# Patient Record
Sex: FEMALE | Race: WHITE | Employment: FULL TIME | URBAN - METROPOLITAN AREA
[De-identification: names, ages, dates, MRNs, and addresses within clinical notes are randomized per-mention and may not be internally consistent; named-entity substitution may affect disease eponyms.]

---

## 2020-09-09 ENCOUNTER — HOSPITAL ENCOUNTER (OUTPATIENT)
Dept: SLEEP MEDICINE | Age: 30
Discharge: HOME | End: 2020-09-09
Attending: PSYCHIATRY & NEUROLOGY
Payer: OTHER GOVERNMENT

## 2020-09-09 DIAGNOSIS — G47.33 OSA (OBSTRUCTIVE SLEEP APNEA): ICD-10-CM

## 2020-09-09 DIAGNOSIS — G47.9 SLEEP DISTURBANCES: ICD-10-CM

## 2020-09-09 PROCEDURE — 95810 POLYSOM 6/> YRS 4/> PARAM: CPT

## 2020-09-10 VITALS
TEMPERATURE: 97.8 F | DIASTOLIC BLOOD PRESSURE: 70 MMHG | HEART RATE: 58 BPM | HEIGHT: 65 IN | SYSTOLIC BLOOD PRESSURE: 105 MMHG

## 2021-03-30 ENCOUNTER — TELEPHONE (OUTPATIENT)
Dept: OBSTETRICS AND GYNECOLOGY | Facility: CLINIC | Age: 31
End: 2021-03-30

## 2021-03-30 DIAGNOSIS — N97.0 INFERTILITY ASSOCIATED WITH ANOVULATION: Primary | ICD-10-CM

## 2021-04-19 ENCOUNTER — TELEPHONE (OUTPATIENT)
Dept: OBSTETRICS AND GYNECOLOGY | Facility: CLINIC | Age: 31
End: 2021-04-19

## 2021-04-19 DIAGNOSIS — N97.0 INFERTILITY ASSOCIATED WITH ANOVULATION: Primary | ICD-10-CM

## 2021-04-19 RX ORDER — CLOMIPHENE CITRATE 50 MG/1
50 TABLET ORAL DAILY
COMMUNITY
End: 2021-10-25

## 2021-05-05 DIAGNOSIS — N97.0 INFERTILITY ASSOCIATED WITH ANOVULATION: Primary | ICD-10-CM

## 2021-05-10 ENCOUNTER — TELEPHONE (OUTPATIENT)
Dept: OBSTETRICS AND GYNECOLOGY | Facility: CLINIC | Age: 31
End: 2021-05-10

## 2021-05-27 DIAGNOSIS — N97.0 INFERTILITY ASSOCIATED WITH ANOVULATION: Primary | ICD-10-CM

## 2021-07-06 DIAGNOSIS — N97.0 INFERTILITY ASSOCIATED WITH ANOVULATION: Primary | ICD-10-CM

## 2021-07-09 ENCOUNTER — PATIENT MESSAGE (OUTPATIENT)
Dept: OBSTETRICS AND GYNECOLOGY | Facility: CLINIC | Age: 31
End: 2021-07-09

## 2021-07-12 ENCOUNTER — PATIENT MESSAGE (OUTPATIENT)
Dept: OBSTETRICS AND GYNECOLOGY | Facility: CLINIC | Age: 31
End: 2021-07-12

## 2021-08-30 ENCOUNTER — HOSPITAL ENCOUNTER (OUTPATIENT)
Dept: RADIOLOGY | Facility: HOSPITAL | Age: 31
Discharge: HOME | End: 2021-08-30
Attending: OBSTETRICS & GYNECOLOGY
Payer: OTHER GOVERNMENT

## 2021-08-30 ENCOUNTER — INITIAL PRENATAL (OUTPATIENT)
Dept: OBSTETRICS AND GYNECOLOGY | Facility: CLINIC | Age: 31
End: 2021-08-30
Payer: OTHER GOVERNMENT

## 2021-08-30 VITALS — WEIGHT: 169.31 LBS | SYSTOLIC BLOOD PRESSURE: 100 MMHG | DIASTOLIC BLOOD PRESSURE: 64 MMHG

## 2021-08-30 DIAGNOSIS — O09.291 HISTORY OF MISCARRIAGE, CURRENTLY PREGNANT, FIRST TRIMESTER: ICD-10-CM

## 2021-08-30 DIAGNOSIS — O09.90 HIGH RISK PREGNANCY, ANTEPARTUM: Primary | ICD-10-CM

## 2021-08-30 PROCEDURE — 87591 CHLAMYDIA/GONORRHOEAE(GC), PCR: ICD-10-PCS | Mod: ,,, | Performed by: CLINICAL MEDICAL LABORATORY

## 2021-08-30 PROCEDURE — 87491 CHLAMYDIA/GONORRHOEAE(GC), PCR: ICD-10-PCS | Mod: ,,, | Performed by: CLINICAL MEDICAL LABORATORY

## 2021-08-30 PROCEDURE — 87491 CHLMYD TRACH DNA AMP PROBE: CPT | Mod: ,,, | Performed by: CLINICAL MEDICAL LABORATORY

## 2021-08-30 PROCEDURE — 76801 OB US < 14 WKS SINGLE FETUS: CPT | Mod: TC

## 2021-08-30 PROCEDURE — 99203 PR OFFICE/OUTPT VISIT, NEW, LEVL III, 30-44 MIN: ICD-10-PCS | Mod: ,,, | Performed by: OBSTETRICS & GYNECOLOGY

## 2021-08-30 PROCEDURE — 76801 US OB <14 WEEKS TRANSABDOM, SINGLE GESTATION: ICD-10-PCS | Mod: 26,,, | Performed by: RADIOLOGY

## 2021-08-30 PROCEDURE — 87591 N.GONORRHOEAE DNA AMP PROB: CPT | Mod: ,,, | Performed by: CLINICAL MEDICAL LABORATORY

## 2021-08-30 PROCEDURE — 76801 OB US < 14 WKS SINGLE FETUS: CPT | Mod: 26,,, | Performed by: RADIOLOGY

## 2021-08-30 PROCEDURE — 99213 OFFICE O/P EST LOW 20 MIN: CPT | Mod: PBBFAC,25 | Performed by: OBSTETRICS & GYNECOLOGY

## 2021-08-30 PROCEDURE — 88142 CYTOPATH C/V THIN LAYER: CPT | Mod: GCY | Performed by: OBSTETRICS & GYNECOLOGY

## 2021-08-30 PROCEDURE — 99203 OFFICE O/P NEW LOW 30 MIN: CPT | Mod: ,,, | Performed by: OBSTETRICS & GYNECOLOGY

## 2021-08-31 LAB
CHLAMYDIA BY PCR: NEGATIVE
N. GONORRHOEAE (GC) BY PCR: NEGATIVE

## 2021-09-01 LAB
GH SERPL-MCNC: NORMAL NG/ML
INSULIN SERPL-ACNC: NORMAL U[IU]/ML
LAB AP CLINICAL INFORMATION: NORMAL
LAB AP GYN INTERPRETATION: NEGATIVE
LAB AP PAP DISCLAIMER COMMENTS: NORMAL
RENIN PLAS-CCNC: NORMAL NG/ML/H

## 2021-09-27 ENCOUNTER — ROUTINE PRENATAL (OUTPATIENT)
Dept: OBSTETRICS AND GYNECOLOGY | Facility: CLINIC | Age: 31
End: 2021-09-27
Payer: OTHER GOVERNMENT

## 2021-09-27 VITALS
DIASTOLIC BLOOD PRESSURE: 66 MMHG | BODY MASS INDEX: 28.03 KG/M2 | HEIGHT: 65 IN | SYSTOLIC BLOOD PRESSURE: 102 MMHG | WEIGHT: 168.25 LBS

## 2021-09-27 DIAGNOSIS — Z82.79 FAMILY HISTORY OF CLEFT LIP: ICD-10-CM

## 2021-09-27 DIAGNOSIS — Z82.79 FAMILY HISTORY OF SPINA BIFIDA: ICD-10-CM

## 2021-09-27 DIAGNOSIS — Z34.82 PRENATAL CARE, SUBSEQUENT PREGNANCY, SECOND TRIMESTER: Primary | ICD-10-CM

## 2021-09-27 LAB
BILIRUB SERPL-MCNC: NEGATIVE MG/DL
BLOOD URINE, POC: NEGATIVE
COLOR, POC UA: NORMAL
GLUCOSE UR QL STRIP: NEGATIVE
KETONES UR QL STRIP: NEGATIVE
LEUKOCYTE ESTERASE URINE, POC: NEGATIVE
NITRITE, POC UA: NEGATIVE
PH, POC UA: NORMAL
PROTEIN, POC: NEGATIVE
SPECIFIC GRAVITY, POC UA: NORMAL
UROBILINOGEN, POC UA: NEGATIVE

## 2021-09-27 PROCEDURE — 81003 URINALYSIS AUTO W/O SCOPE: CPT | Mod: PBBFAC | Performed by: OBSTETRICS & GYNECOLOGY

## 2021-09-27 PROCEDURE — 0502F PR SUBSEQUENT PRENATAL CARE: ICD-10-PCS | Mod: ,,, | Performed by: OBSTETRICS & GYNECOLOGY

## 2021-09-27 PROCEDURE — 99213 OFFICE O/P EST LOW 20 MIN: CPT | Mod: PBBFAC | Performed by: OBSTETRICS & GYNECOLOGY

## 2021-09-27 PROCEDURE — 0502F SUBSEQUENT PRENATAL CARE: CPT | Mod: ,,, | Performed by: OBSTETRICS & GYNECOLOGY

## 2021-10-25 ENCOUNTER — HOSPITAL ENCOUNTER (OUTPATIENT)
Dept: RADIOLOGY | Facility: HOSPITAL | Age: 31
Discharge: HOME | End: 2021-10-25
Attending: OBSTETRICS & GYNECOLOGY
Payer: OTHER GOVERNMENT

## 2021-10-25 ENCOUNTER — ROUTINE PRENATAL (OUTPATIENT)
Dept: OBSTETRICS AND GYNECOLOGY | Facility: CLINIC | Age: 31
End: 2021-10-25
Payer: OTHER GOVERNMENT

## 2021-10-25 VITALS
BODY MASS INDEX: 28.56 KG/M2 | WEIGHT: 171.63 LBS | DIASTOLIC BLOOD PRESSURE: 82 MMHG | SYSTOLIC BLOOD PRESSURE: 120 MMHG

## 2021-10-25 DIAGNOSIS — O09.90 HIGH RISK PREGNANCY, ANTEPARTUM: Primary | ICD-10-CM

## 2021-10-25 DIAGNOSIS — O26.12 LOW WEIGHT GAIN DURING PREGNANCY IN SECOND TRIMESTER: ICD-10-CM

## 2021-10-25 PROCEDURE — 0502F SUBSEQUENT PRENATAL CARE: CPT | Mod: ,,, | Performed by: OBSTETRICS & GYNECOLOGY

## 2021-10-25 PROCEDURE — 81003 URINALYSIS AUTO W/O SCOPE: CPT | Mod: PBBFAC | Performed by: OBSTETRICS & GYNECOLOGY

## 2021-10-25 PROCEDURE — 76805 OB US >/= 14 WKS SNGL FETUS: CPT | Mod: TC

## 2021-10-25 PROCEDURE — 99213 OFFICE O/P EST LOW 20 MIN: CPT | Mod: PBBFAC,25 | Performed by: OBSTETRICS & GYNECOLOGY

## 2021-10-25 PROCEDURE — 76805 OB US >/= 14 WKS SNGL FETUS: CPT | Mod: 26,,, | Performed by: RADIOLOGY

## 2021-10-25 PROCEDURE — 0502F PR SUBSEQUENT PRENATAL CARE: ICD-10-PCS | Mod: ,,, | Performed by: OBSTETRICS & GYNECOLOGY

## 2021-10-25 PROCEDURE — 76805 US OB 14+ WEEKS, TRANSABDOM, SINGLE GESTATION: ICD-10-PCS | Mod: 26,,, | Performed by: RADIOLOGY

## 2021-11-22 ENCOUNTER — ROUTINE PRENATAL (OUTPATIENT)
Dept: OBSTETRICS AND GYNECOLOGY | Facility: CLINIC | Age: 31
End: 2021-11-22
Payer: OTHER GOVERNMENT

## 2021-11-22 VITALS — BODY MASS INDEX: 29.54 KG/M2 | SYSTOLIC BLOOD PRESSURE: 90 MMHG | DIASTOLIC BLOOD PRESSURE: 64 MMHG | WEIGHT: 177.5 LBS

## 2021-11-22 DIAGNOSIS — O09.90 HIGH RISK PREGNANCY, ANTEPARTUM: Primary | ICD-10-CM

## 2021-11-22 PROCEDURE — 0502F PR SUBSEQUENT PRENATAL CARE: ICD-10-PCS | Mod: ,,, | Performed by: OBSTETRICS & GYNECOLOGY

## 2021-11-22 PROCEDURE — 99213 OFFICE O/P EST LOW 20 MIN: CPT | Mod: PBBFAC | Performed by: OBSTETRICS & GYNECOLOGY

## 2021-11-22 PROCEDURE — 81003 URINALYSIS AUTO W/O SCOPE: CPT | Mod: PBBFAC | Performed by: OBSTETRICS & GYNECOLOGY

## 2021-11-22 PROCEDURE — 0502F SUBSEQUENT PRENATAL CARE: CPT | Mod: ,,, | Performed by: OBSTETRICS & GYNECOLOGY

## 2022-01-05 ENCOUNTER — ROUTINE PRENATAL (OUTPATIENT)
Dept: OBSTETRICS AND GYNECOLOGY | Facility: CLINIC | Age: 32
End: 2022-01-05
Payer: OTHER GOVERNMENT

## 2022-01-05 VITALS — BODY MASS INDEX: 30.45 KG/M2 | DIASTOLIC BLOOD PRESSURE: 50 MMHG | SYSTOLIC BLOOD PRESSURE: 98 MMHG | WEIGHT: 183 LBS

## 2022-01-05 DIAGNOSIS — Z3A.28 28 WEEKS GESTATION OF PREGNANCY: ICD-10-CM

## 2022-01-05 DIAGNOSIS — O09.90 HIGH RISK PREGNANCY, ANTEPARTUM: Primary | ICD-10-CM

## 2022-01-05 PROCEDURE — 0502F PR SUBSEQUENT PRENATAL CARE: ICD-10-PCS | Mod: ,,, | Performed by: OBSTETRICS & GYNECOLOGY

## 2022-01-05 PROCEDURE — 81003 URINALYSIS AUTO W/O SCOPE: CPT | Mod: PBBFAC | Performed by: OBSTETRICS & GYNECOLOGY

## 2022-01-05 PROCEDURE — 99213 OFFICE O/P EST LOW 20 MIN: CPT | Mod: PBBFAC | Performed by: OBSTETRICS & GYNECOLOGY

## 2022-01-05 PROCEDURE — 0502F SUBSEQUENT PRENATAL CARE: CPT | Mod: ,,, | Performed by: OBSTETRICS & GYNECOLOGY

## 2022-01-05 RX ORDER — OMEPRAZOLE 10 MG/1
10 CAPSULE, DELAYED RELEASE ORAL DAILY
COMMUNITY

## 2022-01-06 RX ORDER — LANOLIN ALCOHOL/MO/W.PET/CERES
1 CREAM (GRAM) TOPICAL DAILY
Qty: 30 TABLET | Refills: 6 | Status: CANCELLED | OUTPATIENT
Start: 2022-01-06

## 2022-01-07 NOTE — PROGRESS NOTES
31 y.o. female  at 28w6d   Reports fetal movement or fluttering. Denies any vaginal bleeding, leakage of fluid, cramping, contractions, or pressure.   Total weight gain/weight loss in pregnancy: 8.165 kg (18 lb)     A: 28w6d     ICD-10-CM ICD-9-CM    1. High risk pregnancy, antepartum  O09.90 V23.9 POCT URINALYSIS W/O SCOPE   2. 28 weeks gestation of pregnancy  Z3A.28 V22.2 POCT URINALYSIS W/O SCOPE       P: Bleeding, daily fetal kick counts, and  labor/labor precautions discussed.  Questions answered to desired level of satisfaction  Verbalized understanding to all information and instructions provided.    Roshan Payne MD

## 2022-01-07 NOTE — PROGRESS NOTES
31 y.o. female  at 28w6d   Reports fetal movement or fluttering. Denies any vaginal bleeding, leakage of fluid, cramping, contractions, or pressure.   Total weight gain/weight loss in pregnancy: 8.165 kg (18 lb)     A: 28w6d     ICD-10-CM ICD-9-CM    1. High risk pregnancy, antepartum  O09.90 V23.9 POCT URINALYSIS W/O SCOPE   2. 28 weeks gestation of pregnancy  Z3A.28 V22.2 POCT URINALYSIS W/O SCOPE       P: Bleeding, daily fetal kick counts, and  labor/labor precautions discussed.  Questions answered to desired level of satisfaction  Verbalized understanding to all information and instructions provided.    Follow up in about 2 weeks (around 2022).    Roshan Payne MD

## 2022-02-01 ENCOUNTER — ROUTINE PRENATAL (OUTPATIENT)
Dept: OBSTETRICS AND GYNECOLOGY | Facility: CLINIC | Age: 32
End: 2022-02-01
Payer: OTHER GOVERNMENT

## 2022-02-01 VITALS — SYSTOLIC BLOOD PRESSURE: 98 MMHG | DIASTOLIC BLOOD PRESSURE: 60 MMHG | WEIGHT: 182.38 LBS | BODY MASS INDEX: 30.35 KG/M2

## 2022-02-01 DIAGNOSIS — Z3A.32 32 WEEKS GESTATION OF PREGNANCY: ICD-10-CM

## 2022-02-01 DIAGNOSIS — O09.90 HIGH RISK PREGNANCY, ANTEPARTUM: Primary | ICD-10-CM

## 2022-02-01 PROCEDURE — 81003 URINALYSIS AUTO W/O SCOPE: CPT | Mod: PBBFAC | Performed by: OBSTETRICS & GYNECOLOGY

## 2022-02-01 PROCEDURE — 0502F PR SUBSEQUENT PRENATAL CARE: ICD-10-PCS | Mod: ,,, | Performed by: OBSTETRICS & GYNECOLOGY

## 2022-02-01 PROCEDURE — 99213 OFFICE O/P EST LOW 20 MIN: CPT | Mod: PBBFAC | Performed by: OBSTETRICS & GYNECOLOGY

## 2022-02-01 PROCEDURE — 0502F SUBSEQUENT PRENATAL CARE: CPT | Mod: ,,, | Performed by: OBSTETRICS & GYNECOLOGY

## 2022-02-15 ENCOUNTER — ROUTINE PRENATAL (OUTPATIENT)
Dept: OBSTETRICS AND GYNECOLOGY | Facility: CLINIC | Age: 32
End: 2022-02-15
Payer: OTHER GOVERNMENT

## 2022-02-15 VITALS — BODY MASS INDEX: 30.95 KG/M2 | WEIGHT: 186 LBS | DIASTOLIC BLOOD PRESSURE: 58 MMHG | SYSTOLIC BLOOD PRESSURE: 102 MMHG

## 2022-02-15 DIAGNOSIS — O09.90 HIGH RISK PREGNANCY, ANTEPARTUM: Primary | ICD-10-CM

## 2022-02-15 PROCEDURE — 99213 OFFICE O/P EST LOW 20 MIN: CPT | Mod: PBBFAC | Performed by: OBSTETRICS & GYNECOLOGY

## 2022-02-15 PROCEDURE — 0502F PR SUBSEQUENT PRENATAL CARE: ICD-10-PCS | Mod: S$PBB,,, | Performed by: OBSTETRICS & GYNECOLOGY

## 2022-02-15 PROCEDURE — 0502F SUBSEQUENT PRENATAL CARE: CPT | Mod: S$PBB,,, | Performed by: OBSTETRICS & GYNECOLOGY

## 2022-02-15 RX ORDER — FERROUS SULFATE 325(65) MG
325 TABLET ORAL
COMMUNITY

## 2022-02-15 NOTE — PROGRESS NOTES
31 y.o. female  at 34w3d   Reports fetal movement or fluttering. Denies any vaginal bleeding, leakage of fluid, cramping, contractions, or pressure.   Total weight gain/weight loss in pregnancy: 9.526 kg (21 lb)     A: 34w3d     ICD-10-CM ICD-9-CM    1. High risk pregnancy, antepartum  O09.90 V23.9 POCT URINALYSIS W/O SCOPE       P: Bleeding, daily fetal kick counts, and  labor/labor precautions discussed.  Questions answered to desired level of satisfaction  Verbalized understanding to all information and instructions provided.    Follow up in about 2 weeks (around 3/1/2022).    Roshan Payne MD

## 2022-02-27 NOTE — PROGRESS NOTES
31 y.o. female  at 32w3d   Reports fetal movement or fluttering. Denies any vaginal bleeding, leakage of fluid, cramping, contractions, or pressure.   Total weight gain/weight loss in pregnancy: 7.893 kg (17 lb 6.4 oz)     A: 32w3d     ICD-10-CM ICD-9-CM    1. High risk pregnancy, antepartum  O09.90 V23.9 POCT URINALYSIS W/O SCOPE   2. 32 weeks gestation of pregnancy  Z3A.32 V22.2 POCT URINALYSIS W/O SCOPE       P: Bleeding, daily fetal kick counts, and  labor/labor precautions discussed.  Questions answered to desired level of satisfaction  Verbalized understanding to all information and instructions provided.    No follow-ups on file.    Roshan Payne MD

## 2022-03-03 ENCOUNTER — ROUTINE PRENATAL (OUTPATIENT)
Dept: OBSTETRICS AND GYNECOLOGY | Facility: CLINIC | Age: 32
End: 2022-03-03
Payer: OTHER GOVERNMENT

## 2022-03-03 ENCOUNTER — HOSPITAL ENCOUNTER (OUTPATIENT)
Dept: RADIOLOGY | Facility: HOSPITAL | Age: 32
Discharge: HOME | End: 2022-03-03
Attending: OBSTETRICS & GYNECOLOGY
Payer: OTHER GOVERNMENT

## 2022-03-03 VITALS
SYSTOLIC BLOOD PRESSURE: 110 MMHG | DIASTOLIC BLOOD PRESSURE: 62 MMHG | WEIGHT: 190.38 LBS | BODY MASS INDEX: 31.68 KG/M2

## 2022-03-03 DIAGNOSIS — O09.90 HIGH RISK PREGNANCY, ANTEPARTUM: ICD-10-CM

## 2022-03-03 DIAGNOSIS — O09.90 HIGH RISK PREGNANCY, ANTEPARTUM: Primary | ICD-10-CM

## 2022-03-03 PROCEDURE — 0502F SUBSEQUENT PRENATAL CARE: CPT | Mod: ,,, | Performed by: OBSTETRICS & GYNECOLOGY

## 2022-03-03 PROCEDURE — 87653 STREP B DNA AMP PROBE: CPT | Mod: ,,, | Performed by: CLINICAL MEDICAL LABORATORY

## 2022-03-03 PROCEDURE — 87653 STREP B SCREEN, ANTEPARTUM: ICD-10-PCS | Mod: ,,, | Performed by: CLINICAL MEDICAL LABORATORY

## 2022-03-03 PROCEDURE — 81003 URINALYSIS AUTO W/O SCOPE: CPT | Mod: PBBFAC | Performed by: OBSTETRICS & GYNECOLOGY

## 2022-03-03 PROCEDURE — 76816 OB US FOLLOW-UP PER FETUS: CPT | Mod: 26,,, | Performed by: RADIOLOGY

## 2022-03-03 PROCEDURE — 99213 OFFICE O/P EST LOW 20 MIN: CPT | Mod: PBBFAC | Performed by: OBSTETRICS & GYNECOLOGY

## 2022-03-03 PROCEDURE — 76816 US OB FOLLOW UP EA GESTATION TRANSABDOMINAL: ICD-10-PCS | Mod: 26,,, | Performed by: RADIOLOGY

## 2022-03-03 PROCEDURE — 76816 OB US FOLLOW-UP PER FETUS: CPT | Mod: TC

## 2022-03-03 PROCEDURE — 0502F PR SUBSEQUENT PRENATAL CARE: ICD-10-PCS | Mod: ,,, | Performed by: OBSTETRICS & GYNECOLOGY

## 2022-03-04 LAB — GROUP B STREP, PCR: NEGATIVE

## 2022-03-06 NOTE — PROGRESS NOTES
31 y.o. female  at 36w5d   Reports fetal movement or fluttering. Denies any vaginal bleeding, leakage of fluid, cramping, contractions, or pressure.   Total weight gain/weight loss in pregnancy: 11.5 kg (25 lb 6.4 oz)     A: 36w5d     ICD-10-CM ICD-9-CM    1. High risk pregnancy, antepartum  O09.90 V23.9 POCT URINALYSIS W/O SCOPE      Strep B Screen, Antepartum       P: Bleeding, daily fetal kick counts, and  labor/labor precautions discussed.  Questions answered to desired level of satisfaction  Verbalized understanding to all information and instructions provided.    Follow up in about 1 year (around 3/3/2023).    Roshan Payne MD

## 2022-03-10 ENCOUNTER — ROUTINE PRENATAL (OUTPATIENT)
Dept: OBSTETRICS AND GYNECOLOGY | Facility: CLINIC | Age: 32
End: 2022-03-10
Payer: OTHER GOVERNMENT

## 2022-03-10 VITALS — BODY MASS INDEX: 31.8 KG/M2 | SYSTOLIC BLOOD PRESSURE: 102 MMHG | DIASTOLIC BLOOD PRESSURE: 62 MMHG | WEIGHT: 191.13 LBS

## 2022-03-10 DIAGNOSIS — O09.90 HIGH RISK PREGNANCY, ANTEPARTUM: Primary | ICD-10-CM

## 2022-03-10 PROCEDURE — 0502F PR SUBSEQUENT PRENATAL CARE: ICD-10-PCS | Mod: ,,, | Performed by: OBSTETRICS & GYNECOLOGY

## 2022-03-10 PROCEDURE — 0502F SUBSEQUENT PRENATAL CARE: CPT | Mod: ,,, | Performed by: OBSTETRICS & GYNECOLOGY

## 2022-03-10 PROCEDURE — 81003 URINALYSIS AUTO W/O SCOPE: CPT | Mod: PBBFAC | Performed by: OBSTETRICS & GYNECOLOGY

## 2022-03-10 PROCEDURE — 99213 OFFICE O/P EST LOW 20 MIN: CPT | Mod: PBBFAC | Performed by: OBSTETRICS & GYNECOLOGY

## 2022-03-15 ENCOUNTER — HOSPITAL ENCOUNTER (INPATIENT)
Facility: HOSPITAL | Age: 32
LOS: 2 days | Discharge: HOME | End: 2022-03-18
Attending: OBSTETRICS & GYNECOLOGY | Admitting: OBSTETRICS & GYNECOLOGY
Payer: OTHER GOVERNMENT

## 2022-03-15 DIAGNOSIS — Z3A.38 38 WEEKS GESTATION OF PREGNANCY: ICD-10-CM

## 2022-03-15 DIAGNOSIS — Z34.90 PREGNANCY: ICD-10-CM

## 2022-03-15 RX ORDER — ONDANSETRON 4 MG/1
8 TABLET, ORALLY DISINTEGRATING ORAL EVERY 8 HOURS PRN
Status: DISCONTINUED | OUTPATIENT
Start: 2022-03-15 | End: 2022-03-16

## 2022-03-15 RX ORDER — PROCHLORPERAZINE EDISYLATE 5 MG/ML
5 INJECTION INTRAMUSCULAR; INTRAVENOUS EVERY 6 HOURS PRN
Status: DISCONTINUED | OUTPATIENT
Start: 2022-03-16 | End: 2022-03-17

## 2022-03-15 RX ORDER — OXYTOCIN/RINGER'S LACTATE 30/500 ML
0-30 PLASTIC BAG, INJECTION (ML) INTRAVENOUS CONTINUOUS
Status: DISCONTINUED | OUTPATIENT
Start: 2022-03-16 | End: 2022-03-17

## 2022-03-15 RX ORDER — CALCIUM CARBONATE 200(500)MG
500 TABLET,CHEWABLE ORAL 3 TIMES DAILY PRN
Status: DISCONTINUED | OUTPATIENT
Start: 2022-03-16 | End: 2022-03-18 | Stop reason: HOSPADM

## 2022-03-15 RX ORDER — SODIUM CHLORIDE, SODIUM LACTATE, POTASSIUM CHLORIDE, CALCIUM CHLORIDE 600; 310; 30; 20 MG/100ML; MG/100ML; MG/100ML; MG/100ML
INJECTION, SOLUTION INTRAVENOUS CONTINUOUS
Status: DISCONTINUED | OUTPATIENT
Start: 2022-03-16 | End: 2022-03-16

## 2022-03-15 RX ORDER — SODIUM CHLORIDE 9 MG/ML
INJECTION, SOLUTION INTRAVENOUS
Status: DISCONTINUED | OUTPATIENT
Start: 2022-03-16 | End: 2022-03-17

## 2022-03-15 RX ORDER — ACETAMINOPHEN 325 MG/1
650 TABLET ORAL EVERY 6 HOURS PRN
Status: DISCONTINUED | OUTPATIENT
Start: 2022-03-16 | End: 2022-03-17

## 2022-03-15 RX ORDER — OXYTOCIN/RINGER'S LACTATE 30/500 ML
95 PLASTIC BAG, INJECTION (ML) INTRAVENOUS ONCE
Status: DISCONTINUED | OUTPATIENT
Start: 2022-03-15 | End: 2022-03-17

## 2022-03-15 RX ORDER — OXYTOCIN/RINGER'S LACTATE 30/500 ML
334 PLASTIC BAG, INJECTION (ML) INTRAVENOUS ONCE
Status: DISCONTINUED | OUTPATIENT
Start: 2022-03-15 | End: 2022-03-17

## 2022-03-15 RX ORDER — SIMETHICONE 80 MG
1 TABLET,CHEWABLE ORAL 4 TIMES DAILY PRN
Status: DISCONTINUED | OUTPATIENT
Start: 2022-03-16 | End: 2022-03-18 | Stop reason: HOSPADM

## 2022-03-15 RX ORDER — BUTORPHANOL TARTRATE 1 MG/ML
1 INJECTION INTRAMUSCULAR; INTRAVENOUS
Status: DISCONTINUED | OUTPATIENT
Start: 2022-03-16 | End: 2022-03-17

## 2022-03-15 RX ORDER — SODIUM CHLORIDE, SODIUM LACTATE, POTASSIUM CHLORIDE, CALCIUM CHLORIDE 600; 310; 30; 20 MG/100ML; MG/100ML; MG/100ML; MG/100ML
INJECTION, SOLUTION INTRAVENOUS CONTINUOUS
Status: DISCONTINUED | OUTPATIENT
Start: 2022-03-16 | End: 2022-03-18 | Stop reason: HOSPADM

## 2022-03-15 RX ORDER — ONDANSETRON 4 MG/1
8 TABLET, ORALLY DISINTEGRATING ORAL EVERY 8 HOURS PRN
Status: DISCONTINUED | OUTPATIENT
Start: 2022-03-16 | End: 2022-03-17

## 2022-03-15 RX ORDER — ACETAMINOPHEN 500 MG
500 TABLET ORAL EVERY 6 HOURS PRN
Status: DISCONTINUED | OUTPATIENT
Start: 2022-03-15 | End: 2022-03-16

## 2022-03-15 RX ORDER — PROCHLORPERAZINE EDISYLATE 5 MG/ML
5 INJECTION INTRAMUSCULAR; INTRAVENOUS EVERY 6 HOURS PRN
Status: DISCONTINUED | OUTPATIENT
Start: 2022-03-15 | End: 2022-03-16

## 2022-03-16 ENCOUNTER — ANESTHESIA (OUTPATIENT)
Dept: OBSTETRICS AND GYNECOLOGY | Facility: HOSPITAL | Age: 32
End: 2022-03-16
Payer: OTHER GOVERNMENT

## 2022-03-16 LAB
ALBUMIN SERPL BCP-MCNC: 2.7 G/DL (ref 3.5–5)
ALBUMIN/GLOB SERPL: 0.7 {RATIO}
ALP SERPL-CCNC: 142 U/L (ref 37–98)
ALT SERPL W P-5'-P-CCNC: 25 U/L (ref 13–56)
ANION GAP SERPL CALCULATED.3IONS-SCNC: 11 MMOL/L (ref 7–16)
AST SERPL W P-5'-P-CCNC: 23 U/L (ref 15–37)
BASOPHILS # BLD AUTO: 0.04 K/UL (ref 0–0.2)
BASOPHILS NFR BLD AUTO: 0.4 % (ref 0–1)
BILIRUB SERPL-MCNC: 0.2 MG/DL (ref 0–1.2)
BUN SERPL-MCNC: 11 MG/DL (ref 7–18)
BUN/CREAT SERPL: 14 (ref 6–20)
CALCIUM SERPL-MCNC: 8.9 MG/DL (ref 8.5–10.1)
CHLORIDE SERPL-SCNC: 105 MMOL/L (ref 98–107)
CO2 SERPL-SCNC: 22 MMOL/L (ref 21–32)
CREAT SERPL-MCNC: 0.76 MG/DL (ref 0.55–1.02)
DIFFERENTIAL METHOD BLD: ABNORMAL
EOSINOPHIL # BLD AUTO: 0.06 K/UL (ref 0–0.5)
EOSINOPHIL NFR BLD AUTO: 0.6 % (ref 1–4)
ERYTHROCYTE [DISTWIDTH] IN BLOOD BY AUTOMATED COUNT: 12.6 % (ref 11.5–14.5)
GLOBULIN SER-MCNC: 3.7 G/DL (ref 2–4)
GLUCOSE SERPL-MCNC: 72 MG/DL (ref 74–106)
HBV SURFACE AG SERPL QL IA: NORMAL
HCT VFR BLD AUTO: 37.3 % (ref 38–47)
HGB BLD-MCNC: 12.8 G/DL (ref 12–16)
HIV 1+O+2 AB SERPL QL: NORMAL
IMM GRANULOCYTES # BLD AUTO: 0.03 K/UL (ref 0–0.04)
IMM GRANULOCYTES NFR BLD: 0.3 % (ref 0–0.4)
INDIRECT COOMBS: NORMAL
LYMPHOCYTES # BLD AUTO: 1.53 K/UL (ref 1–4.8)
LYMPHOCYTES NFR BLD AUTO: 15.4 % (ref 27–41)
MCH RBC QN AUTO: 29.7 PG (ref 27–31)
MCHC RBC AUTO-ENTMCNC: 34.3 G/DL (ref 32–36)
MCV RBC AUTO: 86.5 FL (ref 80–96)
MONOCYTES # BLD AUTO: 0.69 K/UL (ref 0–0.8)
MONOCYTES NFR BLD AUTO: 7 % (ref 2–6)
MPC BLD CALC-MCNC: 9.6 FL (ref 9.4–12.4)
NEUTROPHILS # BLD AUTO: 7.57 K/UL (ref 1.8–7.7)
NEUTROPHILS NFR BLD AUTO: 76.3 % (ref 53–65)
NRBC # BLD AUTO: 0 X10E3/UL
NRBC, AUTO (.00): 0 %
PLATELET # BLD AUTO: 261 K/UL (ref 150–400)
POTASSIUM SERPL-SCNC: 4.4 MMOL/L (ref 3.5–5.1)
PROT SERPL-MCNC: 6.4 G/DL (ref 6.4–8.2)
RBC # BLD AUTO: 4.31 M/UL (ref 4.2–5.4)
RH BLD: NORMAL
SODIUM SERPL-SCNC: 134 MMOL/L (ref 136–145)
SYPHILIS AB INTERPRETATION: NORMAL
WBC # BLD AUTO: 9.92 K/UL (ref 4.5–11)

## 2022-03-16 PROCEDURE — 11000001 HC ACUTE MED/SURG PRIVATE ROOM

## 2022-03-16 PROCEDURE — 59400 OBSTETRICAL CARE: CPT | Mod: ,,, | Performed by: OBSTETRICS & GYNECOLOGY

## 2022-03-16 PROCEDURE — 63600175 PHARM REV CODE 636 W HCPCS: Performed by: OBSTETRICS & GYNECOLOGY

## 2022-03-16 PROCEDURE — 87340 HEPATITIS B SURFACE AG IA: CPT | Performed by: OBSTETRICS & GYNECOLOGY

## 2022-03-16 PROCEDURE — 86901 BLOOD TYPING SEROLOGIC RH(D): CPT | Performed by: OBSTETRICS & GYNECOLOGY

## 2022-03-16 PROCEDURE — 86900 BLOOD TYPING SEROLOGIC ABO: CPT | Performed by: OBSTETRICS & GYNECOLOGY

## 2022-03-16 PROCEDURE — 63600175 PHARM REV CODE 636 W HCPCS: Performed by: ANESTHESIOLOGY

## 2022-03-16 PROCEDURE — 59410 PRA OBSTE CARE,VAG DELIV+POSTPARTUM: ICD-10-PCS | Mod: AA,,, | Performed by: ANESTHESIOLOGY

## 2022-03-16 PROCEDURE — 59410 OBSTETRICAL CARE: CPT | Mod: AA,,, | Performed by: ANESTHESIOLOGY

## 2022-03-16 PROCEDURE — 85025 COMPLETE CBC W/AUTO DIFF WBC: CPT | Performed by: OBSTETRICS & GYNECOLOGY

## 2022-03-16 PROCEDURE — 86780 TREPONEMA PALLIDUM: CPT | Performed by: OBSTETRICS & GYNECOLOGY

## 2022-03-16 PROCEDURE — 59400 PR FULL ROUT OBSTE CARE,VAGINAL DELIV: ICD-10-PCS | Mod: ,,, | Performed by: OBSTETRICS & GYNECOLOGY

## 2022-03-16 PROCEDURE — 62326 NJX INTERLAMINAR LMBR/SAC: CPT | Mod: AA | Performed by: ANESTHESIOLOGY

## 2022-03-16 PROCEDURE — 36415 COLL VENOUS BLD VENIPUNCTURE: CPT | Performed by: OBSTETRICS & GYNECOLOGY

## 2022-03-16 PROCEDURE — 87389 HIV-1 AG W/HIV-1&-2 AB AG IA: CPT | Performed by: OBSTETRICS & GYNECOLOGY

## 2022-03-16 PROCEDURE — 80053 COMPREHEN METABOLIC PANEL: CPT | Performed by: OBSTETRICS & GYNECOLOGY

## 2022-03-16 PROCEDURE — C1751 CATH, INF, PER/CENT/MIDLINE: HCPCS | Performed by: ANESTHESIOLOGY

## 2022-03-16 PROCEDURE — 25000003 PHARM REV CODE 250: Performed by: ANESTHESIOLOGY

## 2022-03-16 RX ORDER — KETOROLAC TROMETHAMINE 30 MG/ML
30 INJECTION, SOLUTION INTRAMUSCULAR; INTRAVENOUS EVERY 6 HOURS
Status: CANCELLED | OUTPATIENT
Start: 2022-03-16 | End: 2022-03-17

## 2022-03-16 RX ORDER — PROCHLORPERAZINE EDISYLATE 5 MG/ML
5 INJECTION INTRAMUSCULAR; INTRAVENOUS EVERY 6 HOURS PRN
Status: DISCONTINUED | OUTPATIENT
Start: 2022-03-16 | End: 2022-03-18 | Stop reason: HOSPADM

## 2022-03-16 RX ORDER — OXYTOCIN/RINGER'S LACTATE 30/500 ML
95 PLASTIC BAG, INJECTION (ML) INTRAVENOUS ONCE
Status: CANCELLED | OUTPATIENT
Start: 2022-03-16 | End: 2022-03-16

## 2022-03-16 RX ORDER — EPHEDRINE SULFATE 50 MG/ML
10 INJECTION, SOLUTION INTRAVENOUS ONCE
Status: COMPLETED | OUTPATIENT
Start: 2022-03-16 | End: 2022-03-16

## 2022-03-16 RX ORDER — SODIUM CHLORIDE 0.9 % (FLUSH) 0.9 %
10 SYRINGE (ML) INJECTION
Status: DISCONTINUED | OUTPATIENT
Start: 2022-03-16 | End: 2022-03-18 | Stop reason: HOSPADM

## 2022-03-16 RX ORDER — MUPIROCIN 20 MG/G
OINTMENT TOPICAL
Status: DISCONTINUED | OUTPATIENT
Start: 2022-03-16 | End: 2022-03-18 | Stop reason: HOSPADM

## 2022-03-16 RX ORDER — METHYLERGONOVINE MALEATE 0.2 MG/ML
200 INJECTION INTRAVENOUS
Status: DISCONTINUED | OUTPATIENT
Start: 2022-03-16 | End: 2022-03-18

## 2022-03-16 RX ORDER — OXYCODONE HYDROCHLORIDE 5 MG/1
5 TABLET ORAL EVERY 4 HOURS PRN
Status: CANCELLED | OUTPATIENT
Start: 2022-03-16 | End: 2022-03-17

## 2022-03-16 RX ORDER — LIDOCAINE HYDROCHLORIDE 20 MG/ML
10 INJECTION, SOLUTION EPIDURAL; INFILTRATION; INTRACAUDAL; PERINEURAL ONCE
Status: DISCONTINUED | OUTPATIENT
Start: 2022-03-16 | End: 2022-03-18 | Stop reason: HOSPADM

## 2022-03-16 RX ORDER — OXYCODONE HYDROCHLORIDE 5 MG/1
10 TABLET ORAL EVERY 4 HOURS PRN
Status: CANCELLED | OUTPATIENT
Start: 2022-03-16 | End: 2022-03-17

## 2022-03-16 RX ORDER — FENTANYL/ROPIVACAINE/NS/PF 2MCG/ML-.2
PLASTIC BAG, INJECTION (ML) INJECTION CONTINUOUS
Status: DISCONTINUED | OUTPATIENT
Start: 2022-03-16 | End: 2022-03-17

## 2022-03-16 RX ORDER — ONDANSETRON 2 MG/ML
4 INJECTION INTRAMUSCULAR; INTRAVENOUS EVERY 6 HOURS PRN
Status: DISCONTINUED | OUTPATIENT
Start: 2022-03-16 | End: 2022-03-18 | Stop reason: HOSPADM

## 2022-03-16 RX ORDER — CARBOPROST TROMETHAMINE 250 UG/ML
250 INJECTION, SOLUTION INTRAMUSCULAR
Status: DISCONTINUED | OUTPATIENT
Start: 2022-03-16 | End: 2022-03-18

## 2022-03-16 RX ORDER — ONDANSETRON 2 MG/ML
4 INJECTION INTRAMUSCULAR; INTRAVENOUS EVERY 6 HOURS PRN
Status: CANCELLED | OUTPATIENT
Start: 2022-03-16 | End: 2022-03-17

## 2022-03-16 RX ORDER — ONDANSETRON 4 MG/1
8 TABLET, ORALLY DISINTEGRATING ORAL EVERY 8 HOURS PRN
Status: DISCONTINUED | OUTPATIENT
Start: 2022-03-16 | End: 2022-03-18 | Stop reason: HOSPADM

## 2022-03-16 RX ORDER — MISOPROSTOL 200 UG/1
800 TABLET ORAL
Status: DISCONTINUED | OUTPATIENT
Start: 2022-03-16 | End: 2022-03-18

## 2022-03-16 RX ORDER — DIPHENOXYLATE HYDROCHLORIDE AND ATROPINE SULFATE 2.5; .025 MG/1; MG/1
1 TABLET ORAL 4 TIMES DAILY PRN
Status: DISCONTINUED | OUTPATIENT
Start: 2022-03-16 | End: 2022-03-18 | Stop reason: HOSPADM

## 2022-03-16 RX ORDER — ACETAMINOPHEN 325 MG/1
650 TABLET ORAL EVERY 6 HOURS
Status: CANCELLED | OUTPATIENT
Start: 2022-03-16 | End: 2022-03-17

## 2022-03-16 RX ORDER — LIDOCAINE HYDROCHLORIDE 20 MG/ML
INJECTION, SOLUTION EPIDURAL; INFILTRATION; INTRACAUDAL; PERINEURAL
Status: COMPLETED | OUTPATIENT
Start: 2022-03-16 | End: 2022-03-16

## 2022-03-16 RX ADMIN — SODIUM CHLORIDE, POTASSIUM CHLORIDE, SODIUM LACTATE AND CALCIUM CHLORIDE: 600; 310; 30; 20 INJECTION, SOLUTION INTRAVENOUS at 12:03

## 2022-03-16 RX ADMIN — SODIUM CHLORIDE, POTASSIUM CHLORIDE, SODIUM LACTATE AND CALCIUM CHLORIDE: 600; 310; 30; 20 INJECTION, SOLUTION INTRAVENOUS at 05:03

## 2022-03-16 RX ADMIN — SODIUM CHLORIDE, POTASSIUM CHLORIDE, SODIUM LACTATE AND CALCIUM CHLORIDE: 600; 310; 30; 20 INJECTION, SOLUTION INTRAVENOUS at 07:03

## 2022-03-16 RX ADMIN — LIDOCAINE HYDROCHLORIDE 10 ML: 20 INJECTION, SOLUTION INTRAVENOUS at 01:03

## 2022-03-16 RX ADMIN — OXYTOCIN 2 MILLI-UNITS/MIN: 10 INJECTION, SOLUTION INTRAMUSCULAR; INTRAVENOUS at 06:03

## 2022-03-16 RX ADMIN — ROPIVACAINE HYDROCHLORIDE 500 MCG: 10 INJECTION, SOLUTION EPIDURAL at 01:03

## 2022-03-16 RX ADMIN — EPHEDRINE SULFATE 10 MG: 50 INJECTION INTRAVENOUS at 02:03

## 2022-03-16 RX ADMIN — SODIUM CHLORIDE, POTASSIUM CHLORIDE, SODIUM LACTATE AND CALCIUM CHLORIDE 1000 ML: 600; 310; 30; 20 INJECTION, SOLUTION INTRAVENOUS at 05:03

## 2022-03-16 NOTE — PLAN OF CARE
Problem:  Fall Injury Risk  Goal: Absence of Fall, Infant Drop and Related Injury  3/16/2022 0753 by Marychuy Doty RN  Outcome: Ongoing, Progressing  3/16/2022 0753 by Marychuy Doty RN  Outcome: Ongoing, Progressing     Problem: Adult Inpatient Plan of Care  Goal: Plan of Care Review  3/16/2022 0753 by Marychuy Doty RN  Outcome: Ongoing, Progressing  3/16/2022 0753 by Marychuy Doty RN  Outcome: Ongoing, Progressing  Goal: Patient-Specific Goal (Individualized)  3/16/2022 0753 by Marychuy Doty RN  Outcome: Ongoing, Progressing  3/16/2022 0753 by Marychuy Doty RN  Outcome: Ongoing, Progressing  Goal: Absence of Hospital-Acquired Illness or Injury  3/16/2022 0753 by Marychuy Doty RN  Outcome: Ongoing, Progressing  3/16/2022 0753 by Marychuy Doty RN  Outcome: Ongoing, Progressing  Goal: Optimal Comfort and Wellbeing  3/16/2022 0753 by Marychuy Doty RN  Outcome: Ongoing, Progressing  3/16/2022 0753 by Marychuy Doty RN  Outcome: Ongoing, Progressing  Goal: Readiness for Transition of Care  3/16/2022 0753 by Marychuy Doty RN  Outcome: Ongoing, Progressing  3/16/2022 0753 by Marychuy Doty RN  Outcome: Ongoing, Progressing     Problem: Infection  Goal: Absence of Infection Signs and Symptoms  3/16/2022 0753 by Marychuy Doty RN  Outcome: Ongoing, Progressing  3/16/2022 0753 by Marychuy Doty RN  Outcome: Ongoing, Progressing     Problem: Bleeding (Labor)  Goal: Hemostasis  3/16/2022 0753 by Marychuy Doty RN  Outcome: Ongoing, Progressing  3/16/2022 0753 by Marychuy Doty RN  Outcome: Ongoing, Progressing     Problem: Change in Fetal Wellbeing (Labor)  Goal: Stable Fetal Wellbeing  3/16/2022 0753 by Marycuhy Doty RN  Outcome: Ongoing, Progressing  3/16/2022 0753 by Marychuy Doty RN  Outcome: Ongoing, Progressing     Problem: Delayed Labor Progression (Labor)  Goal: Effective Progression to Delivery  3/16/2022 0753 by Marychuy LUIS  DELFINA Doty  Outcome: Ongoing, Progressing  3/16/2022 0753 by Marychuy Doty RN  Outcome: Ongoing, Progressing     Problem: Infection (Labor)  Goal: Absence of Infection Signs and Symptoms  3/16/2022 0753 by Marychuy Doty RN  Outcome: Ongoing, Progressing  3/16/2022 0753 by Marychuy Doty RN  Outcome: Ongoing, Progressing     Problem: Labor Pain (Labor)  Goal: Acceptable Pain Control  3/16/2022 0753 by Marychuy Doty RN  Outcome: Ongoing, Progressing  3/16/2022 0753 by Marychuy Doty RN  Outcome: Ongoing, Progressing     Problem: Uterine Tachysystole (Labor)  Goal: Normal Uterine Contraction Pattern  3/16/2022 0753 by Marychuy Doty RN  Outcome: Ongoing, Progressing  3/16/2022 0753 by Marychuy Doty RN  Outcome: Ongoing, Progressing

## 2022-03-16 NOTE — PLAN OF CARE
Problem:  Fall Injury Risk  Goal: Absence of Fall, Infant Drop and Related Injury  3/16/2022 0813 by Tanya Hubbard RN  Outcome: Ongoing, Not Progressing  3/16/2022 0741 by Tanya Hubbard RN  Outcome: Ongoing, Progressing     Problem: Adult Inpatient Plan of Care  Goal: Plan of Care Review  3/16/2022 0813 by Tanya Hubbard RN  Outcome: Ongoing, Not Progressing  3/16/2022 07 by Tanya Hubbard RN  Outcome: Ongoing, Progressing  Goal: Patient-Specific Goal (Individualized)  3/16/2022 08 by Tanya Hubbard RN  Outcome: Ongoing, Not Progressing  3/16/2022 07 by Tanya Hubbard RN  Outcome: Ongoing, Progressing  Goal: Absence of Hospital-Acquired Illness or Injury  3/16/2022 0813 by Tanya Hubbard RN  Outcome: Ongoing, Not Progressing  3/16/2022 0741 by Tanya Hubbard RN  Outcome: Ongoing, Progressing  Goal: Optimal Comfort and Wellbeing  3/16/2022 0813 by Tanya Hubbard RN  Outcome: Ongoing, Not Progressing  3/16/2022 0741 by Tanya Hubbard RN  Outcome: Ongoing, Progressing  Goal: Readiness for Transition of Care  3/16/2022 08 by Tanya Hubbard RN  Outcome: Ongoing, Not Progressing  3/16/2022 0741 by Tanya Hubbard RN  Outcome: Ongoing, Progressing     Problem: Infection  Goal: Absence of Infection Signs and Symptoms  3/16/2022 0813 by Tanya Hubbard RN  Outcome: Ongoing, Not Progressing  3/16/2022 0741 by Tanya Hubbard RN  Outcome: Ongoing, Progressing     Problem: Bleeding (Labor)  Goal: Hemostasis  3/16/2022 08 by Tanya Hubbard RN  Outcome: Ongoing, Not Progressing  3/16/2022 0741 by Tanya Hubbard RN  Outcome: Ongoing, Progressing     Problem: Change in Fetal Wellbeing (Labor)  Goal: Stable Fetal Wellbeing  3/16/2022 0813 by Tanya Hubbard RN  Outcome: Ongoing, Not Progressing  3/16/2022 0741 by Tanya Hubbard RN  Outcome: Ongoing, Progressing     Problem: Delayed Labor Progression (Labor)  Goal: Effective Progression to Delivery  3/16/2022 0813 by Tanya HALL  DELFINA Hubbard  Outcome: Ongoing, Not Progressing  3/16/2022 0741 by Tanya Hubbard RN  Outcome: Ongoing, Progressing     Problem: Infection (Labor)  Goal: Absence of Infection Signs and Symptoms  3/16/2022 0813 by Tanya Hubbard RN  Outcome: Ongoing, Not Progressing  3/16/2022 0741 by Tanya Hubbard RN  Outcome: Ongoing, Progressing     Problem: Labor Pain (Labor)  Goal: Acceptable Pain Control  3/16/2022 0813 by Tanya Hubbard RN  Outcome: Ongoing, Not Progressing  3/16/2022 0741 by Tanya Hubbard RN  Outcome: Ongoing, Progressing     Problem: Uterine Tachysystole (Labor)  Goal: Normal Uterine Contraction Pattern  3/16/2022 0813 by Tanya Hubbard RN  Outcome: Ongoing, Not Progressing  3/16/2022 0741 by Tanya Hubbard RN  Outcome: Ongoing, Progressing

## 2022-03-16 NOTE — H&P
Chief Complain - broke water bag at 9:00 p.m.    HPI:  [age] year old G 2 P 0010 at 38 and 3/7 WGA presents with complaint of breaking her water at 9:45 p.m..    ROS:  + fetal movement; + leakage of fluid; no vaginal bleeding; + contractions;   All other review of systems negative      Past medical history-none    Past surgical history-wisdom teeth removal    Past OB history-SAB x1    Past gyn history-no STDs or abnormal Paps    Medications-prenatal vitamins, iron    Social history-, Prilosec no alcohol, tobacco, or drugs    Allergies-no known drug allergies    Family history-mother and father-none        Vital signs are stable and the patient is afebrile  Fetal heart tones - 120s to 130s and reactive/category 1; tocometer shows contractions every 3 minutes        Physical exam:    General-alert and oriented x3 no acute distress  Chest-clear to auscultation bilaterally  Heart-regular rate and rhythm  HEENT-PERRL; EOMI  Abdomen-soft, nontender, gravid uterus with no fundal tenderness  -no vulvar vaginal lesions; cervix-1/thick/high; no vaginal bleeding; positive leakage of clear fluid; nitrazine positive  Extremities-mild lower extremity edema      Laboratories:    None    Radiology:    None    Assessment and plan)  1-intrauterine pregnancy at 38 and 3/7 weeks gestational age  2-SROM/active labor-admit for delivery; Dr. Payne notified

## 2022-03-16 NOTE — ANESTHESIA PREPROCEDURE EVALUATION
2022  Mohini Mohamud is a 31 y.o., female.      Pre-op Assessment    I have reviewed the Patient Summary Reports.       I have reviewed the Medications.     Review of Systems         Anesthesia Plan  Type of Anesthesia, risks & benefits discussed:    Anesthesia Type: Epidural  Intra-op Monitoring Plan: Standard ASA Monitors  Post Op Pain Control Plan: multimodal analgesia  Informed Consent: Informed consent signed with the Patient and all parties understand the risks and agree with anesthesia plan.  All questions answered.   ASA Score: 2    Ready For Surgery From Anesthesia Perspective.     .  Allergic to latex  NAC     at 38 weeks  PCOS (polycystic ovarian syndrome) Anxiety     Airway exam deferred (COVID precautions); adequate ROM at neck.    Plan is labor epidural

## 2022-03-16 NOTE — HOSPITAL COURSE
3/16/2022  1500    (SROM at 2130 on 3/15/2022)    Pt comfortable with epidural.   VSSAF   VE   5/ 90/ -30  FHR 140s Cat 1, Reassuring  CTX q 3-5 minutes    IUPC and FSE  placed without difficulty.  Will titrate pitocin to maintain monte[place] units bween 180 and 250.     3/17/22  Postpartum day 1.   Patient is without complaints.  VSSAF    3/18/22  Postpartum Day #2  Pt without complaints  VSSAF  Ready for discharge

## 2022-03-16 NOTE — ANESTHESIA PROCEDURE NOTES
Epidural    Patient location during procedure: OB   Reason for block: primary anesthetic   Reason for block: labor analgesia requested by patient and obstetrician  Diagnosis: IUP   Start time: 3/16/2022 1:38 PM  Timeout: 3/16/2022 1:37 PM  End time: 3/16/2022 1:48 PM    Staffing  Performing Provider: Suhail Langford MD  Authorizing Provider: Suhail Langford MD        Preanesthetic Checklist  Completed: patient identified, pre-op evaluation, timeout performed, anesthesia consent given, hand hygiene performed and patient being monitored  Preparation  Patient position: sitting  Prep: Betadine  Reason for block: primary anesthetic   Epidural  Skin Anesthetic: lidocaine 1%  Administration type: continuous  Interspace: L4-5    Injection technique: JAVIER air  Needle and Epidural Catheter  Insertion Attempts: 1  Test dose: 3 mL of lidocaine 1.5% with Epi 1-to-200,000  Additional Documentation: negative aspiration for heme and CSF  Needle localization: anatomical landmarks  Assessment  Ease of block: easy  Additional Notes  Informed consent. Aseptic technique.   L4/5 epidural catheter. Standard PCEA.   No complications.         Medications:    Medications: LIDOcaine (PF) 20 mg/mL (2%) injection - Epidural   10 mL - 3/16/2022 1:47:00 PM

## 2022-03-17 LAB
BASOPHILS # BLD AUTO: 0.08 K/UL (ref 0–0.2)
BASOPHILS NFR BLD AUTO: 0.6 % (ref 0–1)
DIFFERENTIAL METHOD BLD: ABNORMAL
EOSINOPHIL # BLD AUTO: 0.08 K/UL (ref 0–0.5)
EOSINOPHIL NFR BLD AUTO: 0.6 % (ref 1–4)
ERYTHROCYTE [DISTWIDTH] IN BLOOD BY AUTOMATED COUNT: 13 % (ref 11.5–14.5)
HCT VFR BLD AUTO: 31.2 % (ref 38–47)
HGB BLD-MCNC: 10.3 G/DL (ref 12–16)
IMM GRANULOCYTES # BLD AUTO: 0.06 K/UL (ref 0–0.04)
IMM GRANULOCYTES NFR BLD: 0.5 % (ref 0–0.4)
LYMPHOCYTES # BLD AUTO: 1.56 K/UL (ref 1–4.8)
LYMPHOCYTES NFR BLD AUTO: 12.7 % (ref 27–41)
MCH RBC QN AUTO: 28.9 PG (ref 27–31)
MCHC RBC AUTO-ENTMCNC: 33 G/DL (ref 32–36)
MCV RBC AUTO: 87.6 FL (ref 80–96)
MONOCYTES # BLD AUTO: 0.55 K/UL (ref 0–0.8)
MONOCYTES NFR BLD AUTO: 4.5 % (ref 2–6)
MPC BLD CALC-MCNC: 9.5 FL (ref 9.4–12.4)
NEUTROPHILS # BLD AUTO: 10 K/UL (ref 1.8–7.7)
NEUTROPHILS NFR BLD AUTO: 81.1 % (ref 53–65)
NRBC # BLD AUTO: 0 X10E3/UL
NRBC, AUTO (.00): 0 %
PLATELET # BLD AUTO: 216 K/UL (ref 150–400)
RBC # BLD AUTO: 3.56 M/UL (ref 4.2–5.4)
WBC # BLD AUTO: 12.33 K/UL (ref 4.5–11)

## 2022-03-17 PROCEDURE — 72100002 HC LABOR CARE, 1ST 8 HOURS

## 2022-03-17 PROCEDURE — 85025 COMPLETE CBC W/AUTO DIFF WBC: CPT | Performed by: OBSTETRICS & GYNECOLOGY

## 2022-03-17 PROCEDURE — 25000003 PHARM REV CODE 250: Performed by: OBSTETRICS & GYNECOLOGY

## 2022-03-17 PROCEDURE — 72200005 HC VAGINAL DELIVERY LEVEL II

## 2022-03-17 PROCEDURE — 51702 INSERT TEMP BLADDER CATH: CPT

## 2022-03-17 PROCEDURE — 72100003 HC LABOR CARE, EA. ADDL. 8 HRS

## 2022-03-17 PROCEDURE — 27000909 HC PACK VAGINAL DELIVERY

## 2022-03-17 PROCEDURE — 27000727 HC TRAY FOLEY W-METER 16-18FR

## 2022-03-17 PROCEDURE — 36415 COLL VENOUS BLD VENIPUNCTURE: CPT | Performed by: OBSTETRICS & GYNECOLOGY

## 2022-03-17 PROCEDURE — 11000001 HC ACUTE MED/SURG PRIVATE ROOM

## 2022-03-17 RX ORDER — OXYCODONE AND ACETAMINOPHEN 10; 325 MG/1; MG/1
1 TABLET ORAL EVERY 4 HOURS PRN
Status: DISCONTINUED | OUTPATIENT
Start: 2022-03-17 | End: 2022-03-17

## 2022-03-17 RX ORDER — HYDROCODONE BITARTRATE AND ACETAMINOPHEN 7.5; 325 MG/1; MG/1
1 TABLET ORAL EVERY 4 HOURS PRN
Status: DISCONTINUED | OUTPATIENT
Start: 2022-03-17 | End: 2022-03-18 | Stop reason: HOSPADM

## 2022-03-17 RX ORDER — DOCUSATE SODIUM 100 MG/1
200 CAPSULE, LIQUID FILLED ORAL 2 TIMES DAILY PRN
Status: DISCONTINUED | OUTPATIENT
Start: 2022-03-17 | End: 2022-03-18 | Stop reason: HOSPADM

## 2022-03-17 RX ORDER — HYDROCODONE BITARTRATE AND ACETAMINOPHEN 5; 325 MG/1; MG/1
1 TABLET ORAL EVERY 6 HOURS PRN
Qty: 12 TABLET | Refills: 0 | Status: SHIPPED | OUTPATIENT
Start: 2022-03-17

## 2022-03-17 RX ORDER — ACETAMINOPHEN 325 MG/1
650 TABLET ORAL EVERY 6 HOURS PRN
Status: DISCONTINUED | OUTPATIENT
Start: 2022-03-17 | End: 2022-03-18 | Stop reason: HOSPADM

## 2022-03-17 RX ORDER — OXYCODONE AND ACETAMINOPHEN 5; 325 MG/1; MG/1
1 TABLET ORAL EVERY 4 HOURS PRN
Status: DISCONTINUED | OUTPATIENT
Start: 2022-03-17 | End: 2022-03-17

## 2022-03-17 RX ORDER — PRENATAL WITH FERROUS FUM AND FOLIC ACID 3080; 920; 120; 400; 22; 1.84; 3; 20; 10; 1; 12; 200; 27; 25; 2 [IU]/1; [IU]/1; MG/1; [IU]/1; MG/1; MG/1; MG/1; MG/1; MG/1; MG/1; UG/1; MG/1; MG/1; MG/1; MG/1
1 TABLET ORAL DAILY
Status: DISCONTINUED | OUTPATIENT
Start: 2022-03-17 | End: 2022-03-18 | Stop reason: HOSPADM

## 2022-03-17 RX ORDER — OXYCODONE AND ACETAMINOPHEN 7.5; 325 MG/1; MG/1
1 TABLET ORAL EVERY 4 HOURS PRN
Status: DISCONTINUED | OUTPATIENT
Start: 2022-03-17 | End: 2022-03-17

## 2022-03-17 RX ORDER — IBUPROFEN 600 MG/1
600 TABLET ORAL EVERY 6 HOURS PRN
Status: DISCONTINUED | OUTPATIENT
Start: 2022-03-17 | End: 2022-03-18 | Stop reason: HOSPADM

## 2022-03-17 RX ORDER — IBUPROFEN 600 MG/1
600 TABLET ORAL EVERY 6 HOURS PRN
Qty: 30 TABLET | Refills: 0 | Status: SHIPPED | OUTPATIENT
Start: 2022-03-17

## 2022-03-17 RX ORDER — HYDROCODONE BITARTRATE AND ACETAMINOPHEN 5; 325 MG/1; MG/1
1 TABLET ORAL EVERY 4 HOURS PRN
Status: DISCONTINUED | OUTPATIENT
Start: 2022-03-17 | End: 2022-03-18 | Stop reason: HOSPADM

## 2022-03-17 RX ORDER — DIPHENHYDRAMINE HYDROCHLORIDE 50 MG/ML
25 INJECTION INTRAMUSCULAR; INTRAVENOUS EVERY 4 HOURS PRN
Status: DISCONTINUED | OUTPATIENT
Start: 2022-03-17 | End: 2022-03-18 | Stop reason: HOSPADM

## 2022-03-17 RX ORDER — SIMETHICONE 80 MG
1 TABLET,CHEWABLE ORAL EVERY 6 HOURS PRN
Status: DISCONTINUED | OUTPATIENT
Start: 2022-03-17 | End: 2022-03-18 | Stop reason: HOSPADM

## 2022-03-17 RX ORDER — IBUPROFEN 800 MG/1
800 TABLET ORAL EVERY 8 HOURS PRN
Status: DISCONTINUED | OUTPATIENT
Start: 2022-03-17 | End: 2022-03-17

## 2022-03-17 RX ORDER — DIPHENHYDRAMINE HCL 25 MG
25 CAPSULE ORAL EVERY 4 HOURS PRN
Status: DISCONTINUED | OUTPATIENT
Start: 2022-03-17 | End: 2022-03-18 | Stop reason: HOSPADM

## 2022-03-17 RX ORDER — HYDROCORTISONE 25 MG/G
CREAM TOPICAL 3 TIMES DAILY PRN
Status: DISCONTINUED | OUTPATIENT
Start: 2022-03-17 | End: 2022-03-18 | Stop reason: HOSPADM

## 2022-03-17 RX ADMIN — DOCUSATE SODIUM 200 MG: 100 CAPSULE, LIQUID FILLED ORAL at 08:03

## 2022-03-17 RX ADMIN — Medication 1 TABLET: at 08:03

## 2022-03-17 RX ADMIN — HYDROCODONE BITARTRATE AND ACETAMINOPHEN 1 TABLET: 5; 325 TABLET ORAL at 10:03

## 2022-03-17 RX ADMIN — HYDROCORTISONE 1 APPLICATION: 25 CREAM TOPICAL at 01:03

## 2022-03-17 RX ADMIN — IBUPROFEN 800 MG: 800 TABLET, FILM COATED ORAL at 01:03

## 2022-03-17 RX ADMIN — HYDROCODONE BITARTRATE AND ACETAMINOPHEN 1 TABLET: 7.5; 325 TABLET ORAL at 01:03

## 2022-03-17 RX ADMIN — IBUPROFEN 600 MG: 600 TABLET ORAL at 08:03

## 2022-03-17 RX ADMIN — IBUPROFEN 600 MG: 600 TABLET ORAL at 02:03

## 2022-03-17 RX ADMIN — OXYCODONE AND ACETAMINOPHEN 1 TABLET: 7.5; 325 TABLET ORAL at 01:03

## 2022-03-17 RX ADMIN — HYDROCODONE BITARTRATE AND ACETAMINOPHEN 1 TABLET: 7.5; 325 TABLET ORAL at 08:03

## 2022-03-17 NOTE — PROGRESS NOTES
[facility] -  Labor and Delivery  Obstetrics  Postpartum Progress Note    Patient Name: Mohini Mohamud  MRN: [id number]  Admission Date: 3/15/2022  Hospital Length of Stay: 1 days  Attending Physician: Roshan Payne MD  Primary Care Provider: Richard Street MD    Subjective:     Principal Problem:Normal spontaneous vaginal delivery    Hospital Course:  3/15/2022  1500    (SROM at 2130 on 3/14/2022)    Pt comfortable with epidural.   VSSAF   VE   5/ 90/ -30  FHR 140s Cat 1, Reassuring  CTX q 3-5 minutes    IUPC and FSE  placed without difficulty.  Will titrate pitocin to maintain [place] units bween 180 and 250.     3/16/22  Postpartum day 1.   Patient is without complaints.  VSSAF      Interval History: PPD #1    She is doing well this morning. She is tolerating a regular diet without nausea or vomiting. She is voiding spontaneously. She is ambulating. She has passed flatus, and has not a BM. Vaginal bleeding is mild. She denies fever or chills. Abdominal pain is mild and controlled with oral medications. She Is breastfeeding. She desires circumcision for her male baby: yes.    Objective:     Vital Signs (Most Recent):  Temp: 98.1 °F (36.7 °C) (03/17/22 1527)  Pulse: 82 (03/17/22 1527)  Resp: 16 (03/17/22 1527)  BP: (!) 95/54 (03/17/22 1527)  SpO2: 98 % (03/17/22 1527)   Vital Signs (24h Range):  Temp:  [97.4 °F (36.3 °C)-98.9 °F (37.2 °C)] 98.1 °F (36.7 °C)  Pulse:  [] 82  Resp:  [16-24] 16  SpO2:  [98 %-100 %] 98 %  BP: ()/(52-68) 95/54     Weight: 86.6 kg (191 lb)  Body mass index is 31.78 kg/m².      Intake/Output Summary (Last 24 hours) at 3/17/2022 3161  Last data filed at 3/17/2022 0515  Gross per 24 hour   Intake --   Output 1800 ml   Net -1800 ml         Significant Labs:  Lab Results   Component Value Date    GROUPTRH O POS 03/16/2022    HEPBSAG Non-Reactive 03/16/2022     Recent Labs   Lab 03/17/22  0914   HGB 10.3*   HCT 31.2*       CBC:   Recent Labs   Lab 03/17/22  0914   WBC  12.33*   RBC 3.56*   HGB 10.3*   HCT 31.2*      MCV 87.6   MCH 28.9   MCHC 33.0     I have personallly reviewed all pertinent lab results from the last 24 hours.        Assessment/Plan:     31 y.o. female  for:    * Normal spontaneous vaginal delivery  Patient is stable on postpartum day 1. Probable discharge home in the a.m..        Disposition: As patient meets milestones, will plan to discharge     Roshan Payne MD  Obstetrics  [facility] -  Labor and Delivery

## 2022-03-17 NOTE — SUBJECTIVE & OBJECTIVE
Obstetric HPI:  Patient reports Frequency: Every 3-5 minutes and Intensity: mild contractions, active fetal movement, absent vaginal bleeding , present loss of fluid      Objective:     Vital Signs (Most Recent):  Temp: 98.6 °F (37 °C) (03/16/22 1917)  Pulse: 76 (03/16/22 1921)  Resp: 18 (03/16/22 1920)  BP: 117/62 (03/16/22 1920)  SpO2: 100 % (03/16/22 1921) Vital Signs (24h Range):  Temp:  [97.3 °F (36.3 °C)-98.6 °F (37 °C)] 98.6 °F (37 °C)  Pulse:  [71-90] 76  Resp:  [18-20] 18  SpO2:  [99 %-100 %] 100 %  BP: (106-117)/(62-76) 117/62     Weight: 86.6 kg (191 lb)  Body mass index is 31.78 kg/m².    FHT: 140 Cat 1 (reassuring)  TOCO:  Q 3-5 minutes      Intake/Output Summary (Last 24 hours) at 3/16/2022 2031  Last data filed at 3/16/2022 1917  Gross per 24 hour   Intake --   Output 800 ml   Net -800 ml       Cervical Exam:  Dilation:  0  Effacement:  0%  Station: -3  Presentation: Vertex     Significant Labs:  Recent Lab Results         03/16/22  0003        Albumin/Globulin Ratio 0.7       Albumin 2.7       Alkaline Phosphatase 142       ALT 25       Anion Gap 11       AST 23       Baso # 0.04       Basophil % 0.4       BILIRUBIN TOTAL 0.2       BUN 11       BUN/CREAT RATIO 14       Calcium 8.9       Chloride 105       CO2 22       Creatinine 0.76       Differential Type Auto       eGFR if non  94       Eos # 0.06       Eosinophil % 0.6       Globulin, Total 3.7       Glucose 72       Group & Rh O POS       Hematocrit 37.3       Hemoglobin 12.8       Hepatitis B Surface Ag Non-Reactive       HIV 1/2 Ag/Ab Non-Reactive       Immature Grans (Abs) 0.03       Immature Granulocytes 0.3       INDIRECT VARGHESE NEG       Lymph # 1.53       Lymph % 15.4       MCH 29.7       MCHC 34.3       MCV 86.5       Mono # 0.69       Mono % 7.0       MPV 9.6       Neutrophils, Abs 7.57       Neutrophils Relative 76.3       nRBC 0.0       NUCLEATED RBC ABSOLUTE 0.00       Platelets 261       Potassium 4.4       PROTEIN  TOTAL 6.4       RBC 4.31       RDW 12.6       Sodium 134       Syphilis Ab Interpretation Non-Reactive  Comment: 0.0 - 0.9: Non-Reactive  0.91 - 1.10: Equivocal with RPR to follow  >1.10:  Reactive with RPR to Follow       WBC 9.92               Physical Exam:   Constitutional: She appears well-developed and well-nourished. No distress.    HENT:   Head: Normocephalic and atraumatic.     Neck: No thyromegaly present.    Cardiovascular:  Normal rate, regular rhythm and normal heart sounds.      Exam reveals no edema.        Pulmonary/Chest: Effort normal and breath sounds normal. No respiratory distress.        Abdominal: Soft. Bowel sounds are normal.     Genitourinary:    Vagina, uterus, right adnexa and left adnexa normal.   The external female genitalia was normal.   There is no tenderness or lesion on the right labia. There is no tenderness or lesion on the left labia. Cervix is normal. Uterus size: 39 cm.          Musculoskeletal: Moves all extremeties.       Neurological: She is alert. She has normal reflexes.    Skin: Skin is warm and dry.    Psychiatric: She has a normal mood and affect. Her behavior is normal. Judgment and thought content normal.

## 2022-03-17 NOTE — PLAN OF CARE
Problem:  Fall Injury Risk  Goal: Absence of Fall, Infant Drop and Related Injury  3/16/2022 1909 by Marychuy Doty RN  Outcome: Ongoing, Progressing  3/16/2022 1909 by Marychuy Doty RN  Outcome: Ongoing, Progressing  3/16/2022 0753 by Marychuy Doty RN  Outcome: Ongoing, Progressing  3/16/2022 0753 by Marychuy Doty RN  Outcome: Ongoing, Progressing     Problem: Adult Inpatient Plan of Care  Goal: Plan of Care Review  3/16/2022 1909 by Marychuy Doty RN  Outcome: Ongoing, Progressing  3/16/2022 1909 by Marychuy Doty RN  Outcome: Ongoing, Progressing  3/16/2022 0753 by Marychuy Doty RN  Outcome: Ongoing, Progressing  3/16/2022 0753 by Marychuy Doty RN  Outcome: Ongoing, Progressing  Goal: Patient-Specific Goal (Individualized)  3/16/2022 1909 by Marychuy Doty RN  Outcome: Ongoing, Progressing  3/16/2022 1909 by Marychuy Doty RN  Outcome: Ongoing, Progressing  3/16/2022 0753 by Marychuy Doty RN  Outcome: Ongoing, Progressing  3/16/2022 0753 by Marychuy Doty RN  Outcome: Ongoing, Progressing  Goal: Absence of Hospital-Acquired Illness or Injury  3/16/2022 1909 by Marychuy Doty RN  Outcome: Ongoing, Progressing  3/16/2022 1909 by Marychuy Doty RN  Outcome: Ongoing, Progressing  3/16/2022 0753 by Marychuy Doty RN  Outcome: Ongoing, Progressing  3/16/2022 0753 by Marychuy Doty RN  Outcome: Ongoing, Progressing  Goal: Optimal Comfort and Wellbeing  3/16/2022 1909 by Marychuy Doty RN  Outcome: Ongoing, Progressing  3/16/2022 1909 by Marychuy Doty RN  Outcome: Ongoing, Progressing  3/16/2022 0753 by Marychuy Doty RN  Outcome: Ongoing, Progressing  3/16/2022 0753 by Marychuy Doty RN  Outcome: Ongoing, Progressing  Goal: Readiness for Transition of Care  3/16/2022 1909 by Marychuy Doty RN  Outcome: Ongoing, Progressing  3/16/2022 190 by Marychuy Doty RN  Outcome: Ongoing, Progressing  3/16/2022 0753 by Marychuy  JANELLE Doty RN  Outcome: Ongoing, Progressing  3/16/2022 0753 by Marychuy Doty RN  Outcome: Ongoing, Progressing     Problem: Infection  Goal: Absence of Infection Signs and Symptoms  3/16/2022 1909 by Marychuy Doty RN  Outcome: Ongoing, Progressing  3/16/2022 1909 by Marychuy Doty RN  Outcome: Ongoing, Progressing  3/16/2022 0753 by Marychuy Doty RN  Outcome: Ongoing, Progressing  3/16/2022 0753 by Marychuy Doty RN  Outcome: Ongoing, Progressing     Problem: Bleeding (Labor)  Goal: Hemostasis  3/16/2022 1909 by Marychuy Doty RN  Outcome: Ongoing, Progressing  3/16/2022 1909 by Marychuy Doty RN  Outcome: Ongoing, Progressing  3/16/2022 0753 by Marychuy Doty RN  Outcome: Ongoing, Progressing  3/16/2022 0753 by Marychuy Doty RN  Outcome: Ongoing, Progressing     Problem: Change in Fetal Wellbeing (Labor)  Goal: Stable Fetal Wellbeing  3/16/2022 1909 by Marychuy Doty RN  Outcome: Ongoing, Progressing  3/16/2022 1909 by Marychuy Doty RN  Outcome: Ongoing, Progressing  3/16/2022 0753 by Marychuy Doty RN  Outcome: Ongoing, Progressing  3/16/2022 0753 by Marychuy Doty RN  Outcome: Ongoing, Progressing     Problem: Delayed Labor Progression (Labor)  Goal: Effective Progression to Delivery  3/16/2022 1909 by Marychuy Doty RN  Outcome: Ongoing, Progressing  3/16/2022 1909 by Marychuy Doty RN  Outcome: Ongoing, Progressing  3/16/2022 0753 by Marychuy Doty RN  Outcome: Ongoing, Progressing  3/16/2022 0753 by Marychuy Doty RN  Outcome: Ongoing, Progressing     Problem: Infection (Labor)  Goal: Absence of Infection Signs and Symptoms  3/16/2022 1909 by Marychuy Doty RN  Outcome: Ongoing, Progressing  3/16/2022 1909 by Marychuy Doty RN  Outcome: Ongoing, Progressing  3/16/2022 0753 by Malori C Gallaspy, RN  Outcome: Ongoing, Progressing  3/16/2022 0753 by Marychuy Doty, DELFINA  Outcome: Ongoing, Progressing     Problem: Labor Pain  (Labor)  Goal: Acceptable Pain Control  3/16/2022 1909 by Marychuy Doty RN  Outcome: Ongoing, Progressing  3/16/2022 1909 by Marychuy Doty RN  Outcome: Ongoing, Progressing  3/16/2022 0753 by Marychuy Doty RN  Outcome: Ongoing, Progressing  3/16/2022 0753 by Marychuy Doty RN  Outcome: Ongoing, Progressing     Problem: Uterine Tachysystole (Labor)  Goal: Normal Uterine Contraction Pattern  3/16/2022 1909 by Marychuy Doty RN  Outcome: Ongoing, Progressing  3/16/2022 1909 by Marychuy Doty RN  Outcome: Ongoing, Progressing  3/16/2022 0753 by Marychuy Doty RN  Outcome: Ongoing, Progressing  3/16/2022 0753 by Marychuy Doty RN  Outcome: Ongoing, Progressing

## 2022-03-17 NOTE — SUBJECTIVE & OBJECTIVE
Interval History: PPD #1    She is doing well this morning. She is tolerating a regular diet without nausea or vomiting. She is voiding spontaneously. She is ambulating. She has passed flatus, and has not a BM. Vaginal bleeding is mild. She denies fever or chills. Abdominal pain is mild and controlled with oral medications. She Is breastfeeding. She desires circumcision for her male baby: yes.    Objective:     Vital Signs (Most Recent):  Temp: 98.1 °F (36.7 °C) (03/17/22 1527)  Pulse: 82 (03/17/22 1527)  Resp: 16 (03/17/22 1527)  BP: (!) 95/54 (03/17/22 1527)  SpO2: 98 % (03/17/22 1527)   Vital Signs (24h Range):  Temp:  [97.4 °F (36.3 °C)-98.9 °F (37.2 °C)] 98.1 °F (36.7 °C)  Pulse:  [] 82  Resp:  [16-24] 16  SpO2:  [98 %-100 %] 98 %  BP: ()/(52-68) 95/54     Weight: 86.6 kg (191 lb)  Body mass index is 31.78 kg/m².      Intake/Output Summary (Last 24 hours) at 3/17/2022 1737  Last data filed at 3/17/2022 0515  Gross per 24 hour   Intake --   Output 1800 ml   Net -1800 ml         Significant Labs:  Lab Results   Component Value Date    GROUPTRH O POS 03/16/2022    HEPBSAG Non-Reactive 03/16/2022     Recent Labs   Lab 03/17/22  0914   HGB 10.3*   HCT 31.2*       CBC:   Recent Labs   Lab 03/17/22  0914   WBC 12.33*   RBC 3.56*   HGB 10.3*   HCT 31.2*      MCV 87.6   MCH 28.9   MCHC 33.0     I have personallly reviewed all pertinent lab results from the last 24 hours.

## 2022-03-17 NOTE — L&D DELIVERY NOTE
[facility] -  Labor and Delivery  Vaginal Delivery   Operative Note    SUMMARY   Patient presented to Labor and delivery with complaints of spontaneous rupture membranes at 9:30 p.m. on /3/15/2022   She was closed, thick, and -3 with no uterine activity noted.  She was GBS negative.  Pitocin was initiated at 6:00 a.m. on 03/16/2022 at 9:30 p.m. on 03/15.  and she was complete at 8:00 p.m. 03/16/2022.  She pushed for approximately 1 hour and delivered the head over a second-degree midline episiotomy.  A tight nuchal cord was doubly clamped and divided.  A shoulder dystocia was encountered but was quickly resolved within a minute with  Hossein maneuver and suprapubic pressure.  The infant was taken to the warmer for assessment by the nursery and then brought back to the mother for skin to skin contact.  Apgars of 7 and 8 at 1 and 5 minutes respectively and a birth weight of 6 lb 15 oz or recorded.  The infant was sent to the well born nursery in stable condition following skin skin prolonged contact.  The intact placenta with attached three-vessel cord was spontaneously delivered . patient was noted to have a small 3rd degree extension of her midline episiotomy which was then repaired with 2-0, 3-0, and 4-0  Monocryl without difficulty.  Anesthesia consisted of an epidural.     Infant delivered position OA.  IV Pitocin was given with good uterine tone noted.        Patient tolerated delivery well. Sponge needle and lap counted correctly x2.    Indications: Full-term premature rupture of membranes with onset of labor within 24 hours of rupture  Pregnancy complicated by:   Patient Active Problem List   Diagnosis    Full-term premature rupture of membranes with onset of labor within 24 hours of rupture     Admitting GA: 38w4d    Delivery Information for Romero Mohamud    Birth information:  YOB: 2023   Time of birth: 9:02 PM   Sex: male   Head Delivery Date/Time: 3/16/2022  9:00 PM  "  Delivery type: Vaginal, Spontaneous   Gestational Age: 38w4d    Delivery Providers    Delivering clinician: Roshan Payne MD   Provider Role    Yanni Yo RN Delivery Nurse    Carli Hennessy Dorena Surgical Tech    Doris Cotton RN NICU            Measurements    Weight: 3166 g  Weight (lbs): 6 lb 15.7 oz  Length: 50.8 cm  Length (in): 20"         Apgars    Living status: Living  Apgars:  1 min.:  5 min.:  10 min.:  15 min.:  20 min.:    Skin color:  0  1       Heart rate:  2  2       Reflex irritability:  2  2       Muscle tone:  1  1       Respiratory effort:  2  2       Total:  7  8       Apgars assigned by: VIOLET COTTON RN         Operative Delivery    Forceps attempted?: No  Vacuum extractor attempted?: No         Shoulder Dystocia    Shoulder dystocia present?: Yes  Time recognized: 3/16/2022 21:01:00  First maneuver: suprapubic pressure  First maneuver performed at: 3/16/2022 21:01:00  First maneuver performed by: BHAVIN Yo RN           Presentation    Presentation: Vertex  Position: Occiput Anterior           Interventions/Resuscitation    Method: Bulb Suctioning, Tactile Stimulation       Cord    Vessels: 3 vessels  Complications: Nuchal  Nuchal Intervention: clamped and cut  Nuchal Cord Description: tight nuchal cord  Number of Loops: 1  Delayed Cord Clamping?: No  Cord Clamped Date/Time: 3/16/2022  9:00 PM  Cord Blood Disposition: Sent with Baby  Gases Sent?: No  Stem Cell Collection (by MD): No       Placenta    Placenta delivery date/time: 3/16/2022 2108  Placenta removal: Expressed  Placenta appearance: Intact  Placenta disposition: lab           Labor Events:       labor: No     Labor Onset Date/Time: 03/15/2022 21:30     Dilation Complete Date/Time: 2022 20:01     Start Pushing Date/Time: 2022 20:02     Rupture Date/Time: 03/15/22  2130 (pt. reported)         Rupture type: SRM (Spontaneous Rupture) (pt states her water broke at home around  last night) "         Fluid Amount:       Fluid Color: Clear, Bloody, Tinted       steroids: None     Antibiotics given for GBS: No     Induction: none     Indications for induction:        Augmentation: oxytocin     Indications for augmentation: Ineffective Contraction Pattern     Labor complications: Shoulder Dystocia     Additional complications:          Cervical ripening:                     Delivery:      Episiotomy: Median     Indication for Episiotomy: Instrumented Delivery     Perineal Lacerations: None Repaired:      Periurethral Laceration:   Repaired:     Labial Laceration:   Repaired:     Sulcus Laceration:   Repaired:     Vaginal Laceration:   Repaired:     Cervical Laceration:   Repaired:     Repair suture:       Repair # of packets: 5     Last Value - EBL - Nursing (mL):       Sum - EBL - Nursing (mL): 0     Last Value - EBL - Anesthesia (mL):      Calculated QBL (mL):       Vaginal Sweep Performed: Yes     Surgicount Correct: Yes       Other providers:       Anesthesia    Method: Epidural          Details (if applicable):  Trial of Labor      Categorization:      Priority:     Indications for :     Incision Type:       Additional  information:  Forceps:    Vacuum:    Breech:    Observed anomalies    Other (Comments):

## 2022-03-17 NOTE — HPI
Patient is a 31-year-old  2 para 0 AB1 who presents at 38 weeks and 4 days (by EDC of ) with report of spontaneous rupture membranes at 9:30 p.m. on 03/15/2022.  She received prenatal care with  Dr. Payne and had a total weight gain of 27 pounds with this pregnancy.  Estimated fetal weight on 2022 at 36 weeks and 6 days was 6 lb 12 oz or 3073 g. She is GBS negative O-positive blood type and rubella immune.  She has a latex and rubber allergy.  Pitocin augmentation and spontaneous vaginal delivery is anticipated.

## 2022-03-17 NOTE — ASSESSMENT & PLAN NOTE
Will augment labor with Pitocin.  Spontaneous vaginal delivery is anticipated.  Patient is GBS negative  and  antibiotics are not indicated.

## 2022-03-17 NOTE — PLAN OF CARE
Problem:  Fall Injury Risk  Goal: Absence of Fall, Infant Drop and Related Injury  3/17/2022 0809 by Candelaria Novak RN  Outcome: Ongoing, Progressing  3/17/2022 0808 by Candelaria Novak RN  Outcome: Ongoing, Progressing     Problem: Adult Inpatient Plan of Care  Goal: Plan of Care Review  3/17/2022 0809 by Candelaria Novak RN  Outcome: Ongoing, Progressing  3/17/2022 0808 by Candelaria Novak RN  Outcome: Ongoing, Progressing  Goal: Patient-Specific Goal (Individualized)  3/17/2022 0809 by Candelaria Novak RN  Outcome: Ongoing, Progressing  3/17/2022 0808 by Candelaria Novak RN  Outcome: Ongoing, Progressing  Goal: Absence of Hospital-Acquired Illness or Injury  3/17/2022 0809 by Candelaria Novak RN  Outcome: Ongoing, Progressing  3/17/2022 0808 by Candelaria Novak RN  Outcome: Ongoing, Progressing  Goal: Optimal Comfort and Wellbeing  3/17/2022 0809 by Candelaria Novak RN  Outcome: Ongoing, Progressing  3/17/2022 0808 by Candelaria Novak RN  Outcome: Ongoing, Progressing  Goal: Readiness for Transition of Care  3/17/2022 0809 by Candelaria Novak RN  Outcome: Ongoing, Progressing  3/17/2022 0808 by Candelaria Novak RN  Outcome: Ongoing, Progressing     Problem: Infection  Goal: Absence of Infection Signs and Symptoms  3/17/2022 0809 by Candelaria Novak RN  Outcome: Ongoing, Progressing  3/17/2022 0808 by Candelaria Novak RN  Outcome: Ongoing, Progressing     Problem: Bleeding (Labor)  Goal: Hemostasis  3/17/2022 0809 by Candelaria Novak RN  Outcome: Ongoing, Progressing  3/17/2022 0808 by Candelaria Novak RN  Outcome: Ongoing, Progressing     Problem: Infection (Labor)  Goal: Absence of Infection Signs and Symptoms  3/17/2022 0809 by Candelaria Novak RN  Outcome: Ongoing, Progressing  3/17/2022 0808 by Candelaria Novak RN  Outcome: Ongoing, Progressing     Problem: Labor Pain (Labor)  Goal: Acceptable Pain Control  3/17/2022 0809 by Candelaria COX  DELFINA Novak  Outcome: Ongoing, Progressing  3/17/2022 0808 by Candelaria Novak RN  Outcome: Ongoing, Progressing     Problem: Uterine Tachysystole (Labor)  Goal: Normal Uterine Contraction Pattern  3/17/2022 0809 by Candelaria Novak RN  Outcome: Ongoing, Progressing  3/17/2022 0808 by Candelaria Novak RN  Outcome: Ongoing, Progressing     Problem: Adjustment to Role Transition (Postpartum Vaginal Delivery)  Goal: Successful Maternal Role Transition  Outcome: Ongoing, Progressing     Problem: Bleeding (Postpartum Vaginal Delivery)  Goal: Hemostasis  Outcome: Ongoing, Progressing     Problem: Infection (Postpartum Vaginal Delivery)  Goal: Absence of Infection Signs/Symptoms  Outcome: Ongoing, Progressing     Problem: Pain (Postpartum Vaginal Delivery)  Goal: Acceptable Pain Control  Outcome: Ongoing, Progressing     Problem: Urinary Retention (Postpartum Vaginal Delivery)  Goal: Effective Urinary Elimination  Outcome: Ongoing, Progressing   Preparing for discharge.

## 2022-03-17 NOTE — PROGRESS NOTES
[facility] -  Labor and Delivery  Obstetrics  Antepartum Progress Note    Patient Name: Mohini Mohamud  MRN: [id number]  Admission Date: 3/15/2022  Hospital Length of Stay: 0 days  Attending Physician: Roshan Payne MD  Primary Care Provider: Richard Street MD    Subjective:     Principal Problem:Full-term premature rupture of membranes with onset of labor within 24 hours of rupture    HPI:  Patient is a 31-year-old  2 para 0 AB1 who presents at 38 weeks and 4 days (by EDC of ) with report of spontaneous rupture membranes at 9:30 p.m. on 03/15/2022.  She received prenatal care with  Dr. Payne and had a total weight gain of 27 pounds with this pregnancy.  Estimated fetal weight on 2022 at 36 weeks and 6 days was 6 lb 12 oz or 3073 g. She is GBS negative O-positive blood type and rubella immune.  She has a latex and rubber allergy.  Pitocin augmentation and spontaneous vaginal delivery is anticipated.        Hospital Course:  3/15/2022  1500    (SROM at 2130 on 3/14/2022)    Pt comfortable with epidural.   VSSAF   VE   5/ 90/ -30  FHR 140s Cat 1, Reassuring  CTX q 3-5 minutes    IUPC and FSE  placed without difficulty.  Will titrate pitocin to maintain montevideo units bween 180 and 250.       Obstetric HPI:  Patient reports Frequency: Every 3-5 minutes and Intensity: mild contractions, active fetal movement, absent vaginal bleeding , present loss of fluid      Objective:     Vital Signs (Most Recent):  Temp: 98.6 °F (37 °C) (22)  Pulse: 76 (22)  Resp: 18 (22)  BP: 117/62 (22)  SpO2: 100 % (22) Vital Signs (24h Range):  Temp:  [97.3 °F (36.3 °C)-98.6 °F (37 °C)] 98.6 °F (37 °C)  Pulse:  [71-90] 76  Resp:  [18-20] 18  SpO2:  [99 %-100 %] 100 %  BP: (106-117)/(62-76) 117/62     Weight: 86.6 kg (191 lb)  Body mass index is 31.78 kg/m².    FHT: 140 Cat 1 (reassuring)  TOCO:  Q 3-5 minutes      Intake/Output Summary (Last 24 hours) at  3/16/2022 2031  Last data filed at 3/16/2022 1917  Gross per 24 hour   Intake --   Output 800 ml   Net -800 ml       Cervical Exam:  Dilation:  0  Effacement:  0%  Station: -3  Presentation: Vertex     Significant Labs:  Recent Lab Results         03/16/22  0003        Albumin/Globulin Ratio 0.7       Albumin 2.7       Alkaline Phosphatase 142       ALT 25       Anion Gap 11       AST 23       Baso # 0.04       Basophil % 0.4       BILIRUBIN TOTAL 0.2       BUN 11       BUN/CREAT RATIO 14       Calcium 8.9       Chloride 105       CO2 22       Creatinine 0.76       Differential Type Auto       eGFR if non  94       Eos # 0.06       Eosinophil % 0.6       Globulin, Total 3.7       Glucose 72       Group & Rh O POS       Hematocrit 37.3       Hemoglobin 12.8       Hepatitis B Surface Ag Non-Reactive       HIV 1/2 Ag/Ab Non-Reactive       Immature Grans (Abs) 0.03       Immature Granulocytes 0.3       INDIRECT VARGHESE NEG       Lymph # 1.53       Lymph % 15.4       MCH 29.7       MCHC 34.3       MCV 86.5       Mono # 0.69       Mono % 7.0       MPV 9.6       Neutrophils, Abs 7.57       Neutrophils Relative 76.3       nRBC 0.0       NUCLEATED RBC ABSOLUTE 0.00       Platelets 261       Potassium 4.4       PROTEIN TOTAL 6.4       RBC 4.31       RDW 12.6       Sodium 134       Syphilis Ab Interpretation Non-Reactive  Comment: 0.0 - 0.9: Non-Reactive  0.91 - 1.10: Equivocal with RPR to follow  >1.10:  Reactive with RPR to Follow       WBC 9.92               Physical Exam:   Constitutional: She appears well-developed and well-nourished. No distress.    HENT:   Head: Normocephalic and atraumatic.     Neck: No thyromegaly present.    Cardiovascular:  Normal rate, regular rhythm and normal heart sounds.      Exam reveals no edema.        Pulmonary/Chest: Effort normal and breath sounds normal. No respiratory distress.        Abdominal: Soft. Bowel sounds are normal.     Genitourinary:    Vagina, uterus, right  adnexa and left adnexa normal.   The external female genitalia was normal.   There is no tenderness or lesion on the right labia. There is no tenderness or lesion on the left labia. Cervix is normal. Uterus size: 39 cm.          Musculoskeletal: Moves all extremeties.       Neurological: She is alert. She has normal reflexes.    Skin: Skin is warm and dry.    Psychiatric: She has a normal mood and affect. Her behavior is normal. Judgment and thought content normal.     Assessment/Plan:     31 y.o. female  at 38w4d for:    * Full-term premature rupture of membranes with onset of labor within 24 hours of rupture  Will augment labor with Pitocin.  Spontaneous vaginal delivery is anticipated.  Patient is GBS negative  and  antibiotics are not indicated.          Roshan Payne MD  Obstetrics  [facility] -  Labor and Delivery

## 2022-03-18 VITALS
HEIGHT: 65 IN | WEIGHT: 191 LBS | SYSTOLIC BLOOD PRESSURE: 89 MMHG | OXYGEN SATURATION: 99 % | BODY MASS INDEX: 31.82 KG/M2 | TEMPERATURE: 98 F | RESPIRATION RATE: 18 BRPM | HEART RATE: 75 BPM | DIASTOLIC BLOOD PRESSURE: 54 MMHG

## 2022-03-18 PROCEDURE — 25000003 PHARM REV CODE 250: Performed by: OBSTETRICS & GYNECOLOGY

## 2022-03-18 RX ADMIN — IBUPROFEN 600 MG: 600 TABLET ORAL at 02:03

## 2022-03-18 RX ADMIN — Medication 1 TABLET: at 09:03

## 2022-03-18 RX ADMIN — HYDROCODONE BITARTRATE AND ACETAMINOPHEN 1 TABLET: 5; 325 TABLET ORAL at 06:03

## 2022-03-18 NOTE — ANESTHESIA POSTPROCEDURE EVALUATION
Anesthesia Post Evaluation    Patient: Mohini Mohamud    Procedure(s) Performed: * No procedures listed *    Final Anesthesia Type: epidural      Patient location during evaluation: labor & delivery  Patient participation: Yes- Able to Participate  Level of consciousness: awake and alert  Post-procedure vital signs: reviewed and stable  Pain management: adequate  Airway patency: patent  JACE mitigation strategies: Use of major conduction anesthesia (spinal/epidural) or peripheral nerve block  PONV status at discharge: No PONV  Anesthetic complications: no      Cardiovascular status: hemodynamically stable  Respiratory status: unassisted  Hydration status: euvolemic  Follow-up not needed.          Vitals Value Taken Time   BP 89/54 03/18/22 0718   Temp 36.7 °C (98 °F) 03/18/22 0718   Pulse 75 03/18/22 0718   Resp 18 03/18/22 0718   SpO2 99 % 03/17/22 2052         No case tracking events are documented in the log.      Pain/Promise Score: Pain Rating Prior to Med Admin: 6 (3/18/2022  6:19 AM)  Pain Rating Post Med Admin: 1 (3/17/2022  3:15 PM)

## 2022-03-18 NOTE — DISCHARGE SUMMARY
[facility] -  Labor and Delivery  Obstetrics  Discharge Summary      Patient Name: Mohini Mohamud  MRN: [id number]  Admission Date: 3/15/2022  Hospital Length of Stay: 2 days  Discharge Date and Time:  2022 12:11 PM  Attending Physician: Roshan Payne MD   Discharging Provider: Roshan Payne MD   Primary Care Provider: Richard Street MD    HPI: Patient is a 31-year-old  2 para 0 AB1 who presents at 38 weeks and 4 days (by EDC of ) with report of spontaneous rupture membranes at 9:30 p.m. on 03/15/2022.  She received prenatal care with  Dr. Payne and had a total weight gain of 27 pounds with this pregnancy.  Estimated fetal weight on 2022 at 36 weeks and 6 days was 6 lb 12 oz or 3073 g. She is GBS negative O-positive blood type and rubella immune.  She has a latex and rubber allergy.  Pitocin augmentation and spontaneous vaginal delivery is anticipated.            * No surgery found *     Hospital Course:   3/16/2022  1500    (SROM at 2130 on 3/15/2022)    Pt comfortable with epidural.   VSSAF   VE   5/ 90/ -30  FHR 140s Cat 1, Reassuring  CTX q 3-5 minutes    IUPC and FSE  placed without difficulty.  Will titrate pitocin to maintain montevideo units bween 180 and 250.     3/17/22  Postpartum day 1.   Patient is without complaints.  VSSAF    3/18/22  Postpartum Day #2  Pt without complaints  VSSAF  Ready for discharge         Consults (From admission, onward)        Status Ordering Provider     Inpatient consult to Anesthesiology  Once        Provider:  (Not yet assigned)    Acknowledged GAVIN MAXWELL          Final Active Diagnoses:    Diagnosis Date Noted POA    PRINCIPAL PROBLEM:  Normal spontaneous vaginal delivery [O80] 2022 Not Applicable    Full-term premature rupture of membranes with onset of labor within 24 hours of rupture [O42.02] 2022 Yes     Chronic      Problems Resolved During this Admission:        Significant Diagnostic Studies: Labs:   CMP  No results for input(s): NA, K, CL, CO2, GLU, BUN, CREATININE, CALCIUM, PROT, ALBUMIN, BILITOT, ALKPHOS, AST, ALT, ANIONGAP, ESTGFRAFRICA, EGFRNONAA in the last 48 hours. and CBC   Recent Labs   Lab 03/17/22  0914   WBC 12.33*   HGB 10.3*   HCT 31.2*            Feeding Method: breast    Immunizations     Date Immunization Status Dose Route/Site Given by    03/17/22 0448 MMR Incomplete 0.5 mL Subcutaneous/     03/17/22 0812 Tdap Deferred 0.5 mL Intramuscular/ Candelaria Novak RN          Delivery:    Episiotomy: Median   Lacerations: None   Repair suture:     Repair # of packets: 5   Blood loss (ml):       Birth information:  YOB: 2023   Time of birth: 9:02 PM   Sex: male   Delivery type: Vaginal, Spontaneous   Gestational Age: 38w4d    Delivery Clinician:      Other providers:       Additional  information:  Forceps:    Vacuum:    Breech:    Observed anomalies      Living?:           APGARS  One minute Five minutes Ten minutes   Skin color:         Heart rate:         Grimace:         Muscle tone:         Breathing:         Totals: 7  8        Placenta: Delivered:       appearance    Pending Diagnostic Studies:     None          Discharged Condition: good    Disposition: Home or Self Care    Follow Up:   Follow-up Information     Roshan Payne MD Follow up on 3/23/2022.    Specialty: Obstetrics and Gynecology  Contact information:  [address] [place] [id number]  [phone]                       Patient Instructions:      Diet Adult Regular     Pelvic Rest     No driving until:     Lifting restrictions     Medications:  Current Discharge Medication List      START taking these medications    Details   HYDROcodone-acetaminophen (NORCO) 5-325 mg per tablet Take 1 tablet by mouth every 6 (six) hours as needed for Pain.  Qty: 12 tablet, Refills: 0    Comments: n/a       ibuprofen (ADVIL,MOTRIN) 600 MG tablet Take 1 tablet (600 mg total) by mouth every 6 (six) hours as needed for Pain  (cramping).  Qty: 30 tablet, Refills: 0         CONTINUE these medications which have NOT CHANGED    Details   ferrous sulfate (FEOSOL) 325 mg (65 mg iron) Tab tablet Take 325 mg by mouth daily with breakfast.      omeprazole (PRILOSEC) 10 MG capsule Take 10 mg by mouth once daily.      polycarbophil (FIBERCON) 625 mg tablet Take 625 mg by mouth once daily.      prenatal vit calc,iron,folic (PRENATAL VITAMIN ORAL) Take 1 tablet by mouth once daily.         STOP taking these medications       doxylamine succinate (UNISOM) tablet Comments:   Reason for Stopping:               Roshan Payne MD  Obstetrics  [facility] -  Labor and Delivery

## 2022-03-24 ENCOUNTER — POSTPARTUM VISIT (OUTPATIENT)
Dept: OBSTETRICS AND GYNECOLOGY | Facility: CLINIC | Age: 32
End: 2022-03-24
Payer: OTHER GOVERNMENT

## 2022-03-24 VITALS
BODY MASS INDEX: 29.05 KG/M2 | WEIGHT: 174.38 LBS | RESPIRATION RATE: 16 BRPM | DIASTOLIC BLOOD PRESSURE: 68 MMHG | SYSTOLIC BLOOD PRESSURE: 102 MMHG | HEIGHT: 65 IN

## 2022-03-24 PROCEDURE — 0503F PR POSTPARTUM CARE VISIT: ICD-10-PCS | Mod: ,,, | Performed by: OBSTETRICS & GYNECOLOGY

## 2022-03-24 PROCEDURE — 0503F POSTPARTUM CARE VISIT: CPT | Mod: ,,, | Performed by: OBSTETRICS & GYNECOLOGY

## 2022-03-24 PROCEDURE — 99213 OFFICE O/P EST LOW 20 MIN: CPT | Mod: PBBFAC | Performed by: OBSTETRICS & GYNECOLOGY

## 2022-04-19 RX ORDER — FERROUS SULFATE 325(65) MG
325 TABLET, DELAYED RELEASE (ENTERIC COATED) ORAL 2 TIMES DAILY
Qty: 60 TABLET | Refills: 0 | Status: SHIPPED | OUTPATIENT
Start: 2022-04-19 | End: 2022-05-19

## 2022-04-28 ENCOUNTER — POSTPARTUM VISIT (OUTPATIENT)
Dept: OBSTETRICS AND GYNECOLOGY | Facility: CLINIC | Age: 32
End: 2022-04-28
Payer: OTHER GOVERNMENT

## 2022-04-28 VITALS
RESPIRATION RATE: 16 BRPM | HEIGHT: 65 IN | SYSTOLIC BLOOD PRESSURE: 110 MMHG | BODY MASS INDEX: 30.16 KG/M2 | WEIGHT: 181 LBS | DIASTOLIC BLOOD PRESSURE: 60 MMHG

## 2022-04-28 DIAGNOSIS — Z12.4 SCREENING FOR MALIGNANT NEOPLASM OF THE CERVIX: Primary | ICD-10-CM

## 2022-04-28 PROCEDURE — 0503F POSTPARTUM CARE VISIT: CPT | Mod: ,,, | Performed by: OBSTETRICS & GYNECOLOGY

## 2022-04-28 PROCEDURE — 88142 CYTOPATH C/V THIN LAYER: CPT | Mod: GCY | Performed by: OBSTETRICS & GYNECOLOGY

## 2022-04-28 PROCEDURE — 0503F PR POSTPARTUM CARE VISIT: ICD-10-PCS | Mod: ,,, | Performed by: OBSTETRICS & GYNECOLOGY

## 2022-04-28 PROCEDURE — 99213 OFFICE O/P EST LOW 20 MIN: CPT | Mod: PBBFAC | Performed by: OBSTETRICS & GYNECOLOGY

## 2022-04-28 NOTE — PROGRESS NOTES
"CC: Post-partum follow-up    Mohini Mohamud is a 31 y.o. female  who presents for post-partum visit.  She is S/P a .  She and the baby are doing well.  No pain.  No fever.   No bowel / bladder complaints.    Delivery Date: 2022  Delivery MD: Elmer   Gender: male  Birth Weight: 6 pounds 15.7 ounces  Breast Feeding: YES  Depression: NO  Contraception: no method    Pregnancy was complicated by:  Shoulder dystocia    /68   Resp 16   Ht 5' 5" (1.651 m)   Wt 79.1 kg (174 lb 6.4 oz)   LMP 2021   Breastfeeding Yes   BMI 29.02 kg/m²     ROS:  GENERAL: No fever, chills, fatigability.  VULVAR: No pain, no lesions and no itching.  VAGINAL: No relaxation, no itching, no discharge, no abnormal bleeding and no lesions.  ABDOMEN: No abdominal pain. Denies nausea. Denies vomiting. No diarrhea. No constipation  BREAST: Denies pain. No lumps. No discharge.  URINARY: No incontinence, no nocturia, no frequency and no dysuria.  CARDIOVASCULAR: No chest pain. No shortness of breath. No leg cramps.  NEUROLOGICAL: No headaches. No vision changes.    PHYSICAL EXAM:  ABDOMEN:  Soft, non-tender, non-distended  External genitalia-sutures intact healing nicely no sign of infection.    IMP:  Doing well S/P   Instructions / precautions reviewed  Contraceptive counseling  RTC for 6 week check and pap.      PLAN:  May resume normal activities    Follow up in about 5 weeks (around 2022).        "

## 2022-05-26 NOTE — PROGRESS NOTES
"CC: Post-partum follow-up    Mohini Mohamud is a 31 y.o. female  who presents for post-partum visit.  She is S/P a .  She and the baby are doing well.  No pain.  No fever.   No bowel / bladder complaints.    Delivery Date:   Delivery MD: Elmer    Gender: male  Birth Weight: 7 pounds 0 ounces  Breast Feeding: YES  Depression: NO  Contraception: condoms    Pregnancy was complicated by:  NA    /60   Resp 16   Ht 5' 5" (1.651 m)   Wt 82.1 kg (181 lb)   BMI 30.12 kg/m²     ROS:  GENERAL: No fever, chills, fatigability.  VULVAR: No pain, no lesions and no itching.  VAGINAL: No relaxation, no itching, no discharge, no abnormal bleeding and no lesions.  ABDOMEN: No abdominal pain. Denies nausea. Denies vomiting. No diarrhea. No constipation  BREAST: Denies pain. No lumps. No discharge.  URINARY: No incontinence, no nocturia, no frequency and no dysuria.  CARDIOVASCULAR: No chest pain. No shortness of breath. No leg cramps.  NEUROLOGICAL: No headaches. No vision changes.    PHYSICAL EXAM:  ABDOMEN:  Soft, non-tender, non-distended  VULVA:  Normal, no lesions  CERVIX:  Without lesions, polyps or tenderness.  UTERUS:  Normal size, shape, consistency, no mass or tenderness.  ADNEXA:  Normal in size without mass or tenderness  PAP obtained    IMP:  Doing well S/P   Instructions / precautions reviewed  Contraceptive counseling      PLAN:  May resume normal activities    No follow-ups on file.        "